# Patient Record
Sex: MALE | Race: BLACK OR AFRICAN AMERICAN
[De-identification: names, ages, dates, MRNs, and addresses within clinical notes are randomized per-mention and may not be internally consistent; named-entity substitution may affect disease eponyms.]

---

## 2017-09-06 ENCOUNTER — HOSPITAL ENCOUNTER (OUTPATIENT)
Dept: HOSPITAL 35 - PAIN | Age: 57
End: 2017-09-06
Payer: COMMERCIAL

## 2017-09-06 VITALS — DIASTOLIC BLOOD PRESSURE: 85 MMHG | SYSTOLIC BLOOD PRESSURE: 154 MMHG

## 2017-09-06 DIAGNOSIS — I89.0: Primary | ICD-10-CM

## 2017-09-06 DIAGNOSIS — Z88.6: ICD-10-CM

## 2017-09-06 DIAGNOSIS — G20: ICD-10-CM

## 2017-09-19 ENCOUNTER — HOSPITAL ENCOUNTER (INPATIENT)
Dept: HOSPITAL 35 - ER | Age: 57
LOS: 4 days | Discharge: HOME HEALTH | DRG: 57 | End: 2017-09-23
Attending: INTERNAL MEDICINE | Admitting: INTERNAL MEDICINE
Payer: COMMERCIAL

## 2017-09-19 VITALS — SYSTOLIC BLOOD PRESSURE: 152 MMHG | DIASTOLIC BLOOD PRESSURE: 86 MMHG

## 2017-09-19 VITALS — DIASTOLIC BLOOD PRESSURE: 96 MMHG | SYSTOLIC BLOOD PRESSURE: 145 MMHG

## 2017-09-19 VITALS
BODY MASS INDEX: 35.07 KG/M2 | HEIGHT: 70 IN | SYSTOLIC BLOOD PRESSURE: 137 MMHG | WEIGHT: 245 LBS | DIASTOLIC BLOOD PRESSURE: 81 MMHG

## 2017-09-19 VITALS — SYSTOLIC BLOOD PRESSURE: 132 MMHG | DIASTOLIC BLOOD PRESSURE: 86 MMHG

## 2017-09-19 DIAGNOSIS — N39.0: ICD-10-CM

## 2017-09-19 DIAGNOSIS — Z79.899: ICD-10-CM

## 2017-09-19 DIAGNOSIS — M54.5: ICD-10-CM

## 2017-09-19 DIAGNOSIS — I73.9: ICD-10-CM

## 2017-09-19 DIAGNOSIS — Z82.49: ICD-10-CM

## 2017-09-19 DIAGNOSIS — G89.29: ICD-10-CM

## 2017-09-19 DIAGNOSIS — Z23: ICD-10-CM

## 2017-09-19 DIAGNOSIS — G20: Primary | ICD-10-CM
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
## 2017-09-19 DIAGNOSIS — Z87.19: ICD-10-CM

## 2017-09-19 DIAGNOSIS — E44.0: ICD-10-CM
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
## 2017-09-19 DIAGNOSIS — Z88.5: ICD-10-CM

## 2017-09-19 DIAGNOSIS — I87.8: ICD-10-CM
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
## 2017-09-19 LAB
ALBUMIN SERPL-MCNC: 2.9 G/DL (ref 3.4–5)
ALP SERPL-CCNC: 72 U/L (ref 46–116)
ALT SERPL-CCNC: 16 U/L (ref 30–65)
ANION GAP SERPL CALC-SCNC: 8 MMOL/L (ref 7–16)
AST SERPL-CCNC: 20 U/L (ref 15–37)
BILIRUB SERPL-MCNC: 0.4 MG/DL
BILIRUB UR-MCNC: NEGATIVE MG/DL
BUN SERPL-MCNC: 9 MG/DL (ref 7–18)
CALCIUM SERPL-MCNC: 8.5 MG/DL (ref 8.5–10.1)
CHLORIDE SERPL-SCNC: 106 MMOL/L (ref 98–107)
CO2 SERPL-SCNC: 23 MMOL/L (ref 21–32)
COLOR UR: YELLOW
CREAT SERPL-MCNC: 0.8 MG/DL (ref 0.7–1.3)
ERYTHROCYTE [DISTWIDTH] IN BLOOD BY AUTOMATED COUNT: 14.4 % (ref 10.5–14.5)
GLUCOSE SERPL-MCNC: 87 MG/DL (ref 74–106)
HCT VFR BLD CALC: 37.6 % (ref 42–52)
HGB BLD-MCNC: 12.1 GM/DL (ref 14–18)
KETONES UR STRIP-MCNC: NEGATIVE MG/DL
MCH RBC QN AUTO: 28.5 PG (ref 26–34)
MCHC RBC AUTO-ENTMCNC: 32.3 G/DL (ref 28–37)
MCV RBC: 88.4 FL (ref 80–100)
PLATELET # BLD: 192 THOU/UL (ref 150–400)
POTASSIUM SERPL-SCNC: 4.9 MMOL/L (ref 3.5–5.1)
PROT SERPL-MCNC: 7.2 G/DL (ref 6.4–8.2)
RBC # BLD AUTO: 4.25 MIL/UL (ref 4.5–6)
RBC # UR STRIP: (no result) /UL
SODIUM SERPL-SCNC: 137 MMOL/L (ref 136–145)
SP GR UR STRIP: 1.02 (ref 1–1.03)
TROPONIN I SERPL-MCNC: < 0.04 NG/ML
URINE GLUCOSE-RANDOM*: NEGATIVE
URINE LEUKOCYTES-REFLEX: (no result)
URINE PROTEIN (DIPSTICK): (no result)
URINE WBC-REFLEX: (no result) /HPF (ref 0–5)
UROBILINOGEN UR STRIP-ACNC: 0.2 E.U./DL (ref 0.2–1)
WBC # BLD AUTO: 4.2 THOU/UL (ref 4–11)

## 2017-09-19 PROCEDURE — 10183: CPT

## 2017-09-20 VITALS — DIASTOLIC BLOOD PRESSURE: 99 MMHG | SYSTOLIC BLOOD PRESSURE: 149 MMHG

## 2017-09-20 VITALS — DIASTOLIC BLOOD PRESSURE: 93 MMHG | SYSTOLIC BLOOD PRESSURE: 135 MMHG

## 2017-09-20 VITALS — DIASTOLIC BLOOD PRESSURE: 92 MMHG | SYSTOLIC BLOOD PRESSURE: 142 MMHG

## 2017-09-20 LAB
ANION GAP SERPL CALC-SCNC: 8 MMOL/L (ref 7–16)
BASOPHILS NFR BLD AUTO: 0.6 % (ref 0–2)
BUN SERPL-MCNC: 11 MG/DL (ref 7–18)
CALCIUM SERPL-MCNC: 8.4 MG/DL (ref 8.5–10.1)
CHLORIDE SERPL-SCNC: 107 MMOL/L (ref 98–107)
CO2 SERPL-SCNC: 28 MMOL/L (ref 21–32)
CREAT SERPL-MCNC: 1 MG/DL (ref 0.7–1.3)
EOSINOPHIL NFR BLD: 2.9 % (ref 0–3)
ERYTHROCYTE [DISTWIDTH] IN BLOOD BY AUTOMATED COUNT: 14.2 % (ref 10.5–14.5)
GLUCOSE SERPL-MCNC: 92 MG/DL (ref 74–106)
GRANULOCYTES NFR BLD MANUAL: 44.9 % (ref 36–66)
HCT VFR BLD CALC: 34.6 % (ref 42–52)
HGB BLD-MCNC: 11.5 GM/DL (ref 14–18)
LYMPHOCYTES NFR BLD AUTO: 40.1 % (ref 24–44)
MAGNESIUM SERPL-MCNC: 1.9 MG/DL (ref 1.8–2.4)
MANUAL DIFFERENTIAL PERFORMED BLD QL: NO
MCH RBC QN AUTO: 28.9 PG (ref 26–34)
MCHC RBC AUTO-ENTMCNC: 33.2 G/DL (ref 28–37)
MCV RBC: 87.3 FL (ref 80–100)
MONOCYTES NFR BLD: 11.5 % (ref 1–8)
NEUTROPHILS # BLD: 2 THOU/UL (ref 1.4–8.2)
PLATELET # BLD: 195 THOU/UL (ref 150–400)
POTASSIUM SERPL-SCNC: 3.4 MMOL/L (ref 3.5–5.1)
RBC # BLD AUTO: 3.96 MIL/UL (ref 4.5–6)
SODIUM SERPL-SCNC: 143 MMOL/L (ref 136–145)
WBC # BLD AUTO: 4.5 THOU/UL (ref 4–11)

## 2017-09-21 VITALS — SYSTOLIC BLOOD PRESSURE: 131 MMHG | DIASTOLIC BLOOD PRESSURE: 76 MMHG

## 2017-09-21 VITALS — DIASTOLIC BLOOD PRESSURE: 90 MMHG | SYSTOLIC BLOOD PRESSURE: 130 MMHG

## 2017-09-21 VITALS — SYSTOLIC BLOOD PRESSURE: 152 MMHG | DIASTOLIC BLOOD PRESSURE: 102 MMHG

## 2017-09-21 VITALS — DIASTOLIC BLOOD PRESSURE: 64 MMHG | SYSTOLIC BLOOD PRESSURE: 107 MMHG

## 2017-09-21 VITALS — SYSTOLIC BLOOD PRESSURE: 130 MMHG | DIASTOLIC BLOOD PRESSURE: 84 MMHG

## 2017-09-22 VITALS — SYSTOLIC BLOOD PRESSURE: 117 MMHG | DIASTOLIC BLOOD PRESSURE: 75 MMHG

## 2017-09-22 VITALS — SYSTOLIC BLOOD PRESSURE: 128 MMHG | DIASTOLIC BLOOD PRESSURE: 87 MMHG

## 2017-09-22 VITALS — DIASTOLIC BLOOD PRESSURE: 87 MMHG | SYSTOLIC BLOOD PRESSURE: 128 MMHG

## 2017-09-22 VITALS — DIASTOLIC BLOOD PRESSURE: 84 MMHG | SYSTOLIC BLOOD PRESSURE: 130 MMHG

## 2017-09-22 VITALS — DIASTOLIC BLOOD PRESSURE: 94 MMHG | SYSTOLIC BLOOD PRESSURE: 141 MMHG

## 2017-09-22 VITALS — SYSTOLIC BLOOD PRESSURE: 106 MMHG | DIASTOLIC BLOOD PRESSURE: 69 MMHG

## 2017-09-22 LAB
ANION GAP SERPL CALC-SCNC: 5 MMOL/L (ref 7–16)
BASOPHILS NFR BLD AUTO: 0 % (ref 0–2)
BUN SERPL-MCNC: 12 MG/DL (ref 7–18)
CALCIUM SERPL-MCNC: 8.4 MG/DL (ref 8.5–10.1)
CHLORIDE SERPL-SCNC: 105 MMOL/L (ref 98–107)
CO2 SERPL-SCNC: 28 MMOL/L (ref 21–32)
CREAT SERPL-MCNC: 0.9 MG/DL (ref 0.7–1.3)
EOSINOPHIL NFR BLD: 8 % (ref 0–3)
ERYTHROCYTE [DISTWIDTH] IN BLOOD BY AUTOMATED COUNT: 14.3 % (ref 10.5–14.5)
GLUCOSE SERPL-MCNC: 85 MG/DL (ref 74–106)
GRANULOCYTES NFR BLD MANUAL: 36 % (ref 36–66)
HCT VFR BLD CALC: 37 % (ref 42–52)
HGB BLD-MCNC: 12.2 GM/DL (ref 14–18)
LYMPHOCYTES NFR BLD AUTO: 44 % (ref 24–44)
MANUAL DIFFERENTIAL PERFORMED BLD QL: YES
MCH RBC QN AUTO: 28.9 PG (ref 26–34)
MCHC RBC AUTO-ENTMCNC: 32.9 G/DL (ref 28–37)
MCV RBC: 87.8 FL (ref 80–100)
MONOCYTES NFR BLD: 12 % (ref 1–8)
NEUTROPHILS # BLD: 1.5 THOU/UL (ref 1.4–8.2)
PLATELET # BLD EST: NORMAL 10*3/UL
PLATELET # BLD: 206 THOU/UL (ref 150–400)
POTASSIUM SERPL-SCNC: 4.1 MMOL/L (ref 3.5–5.1)
RBC # BLD AUTO: 4.21 MIL/UL (ref 4.5–6)
RBC MORPH BLD: NORMAL
SODIUM SERPL-SCNC: 138 MMOL/L (ref 136–145)
TOTAL CELL COUNT: 100
WBC # BLD AUTO: 4.1 THOU/UL (ref 4–11)

## 2017-09-23 VITALS — SYSTOLIC BLOOD PRESSURE: 118 MMHG | DIASTOLIC BLOOD PRESSURE: 72 MMHG

## 2017-09-23 VITALS — DIASTOLIC BLOOD PRESSURE: 82 MMHG | SYSTOLIC BLOOD PRESSURE: 121 MMHG

## 2017-10-27 ENCOUNTER — HOSPITAL ENCOUNTER (EMERGENCY)
Dept: HOSPITAL 35 - ER | Age: 57
Discharge: HOME | End: 2017-10-27
Payer: COMMERCIAL

## 2017-10-27 VITALS — WEIGHT: 245 LBS | BODY MASS INDEX: 35.07 KG/M2 | HEIGHT: 70 IN
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
## 2017-10-27 DIAGNOSIS — G89.29: ICD-10-CM

## 2017-10-27 DIAGNOSIS — M54.5: Primary | ICD-10-CM

## 2017-10-27 DIAGNOSIS — G20: ICD-10-CM
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
## 2017-10-27 DIAGNOSIS — Z88.5: ICD-10-CM
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
## 2017-10-27 LAB
ANION GAP SERPL CALC-SCNC: 7 MMOL/L (ref 7–16)
BUN SERPL-MCNC: 7 MG/DL (ref 7–18)
CALCIUM SERPL-MCNC: 8.7 MG/DL (ref 8.5–10.1)
CHLORIDE SERPL-SCNC: 107 MMOL/L (ref 98–107)
CO2 SERPL-SCNC: 27 MMOL/L (ref 21–32)
CREAT SERPL-MCNC: 0.8 MG/DL (ref 0.7–1.3)
ERYTHROCYTE [DISTWIDTH] IN BLOOD BY AUTOMATED COUNT: 14.3 % (ref 10.5–14.5)
GLUCOSE SERPL-MCNC: 94 MG/DL (ref 74–106)
HCT VFR BLD CALC: 36.9 % (ref 42–52)
HGB BLD-MCNC: 12 GM/DL (ref 14–18)
INR PPP: 1
MCH RBC QN AUTO: 28.5 PG (ref 26–34)
MCHC RBC AUTO-ENTMCNC: 32.6 G/DL (ref 28–37)
MCV RBC: 87.2 FL (ref 80–100)
PLATELET # BLD: 222 THOU/UL (ref 150–400)
POTASSIUM SERPL-SCNC: 3.8 MMOL/L (ref 3.5–5.1)
PROTHROMBIN TIME: 10.7 SECONDS (ref 9.3–11.4)
RBC # BLD AUTO: 4.23 MIL/UL (ref 4.5–6)
SODIUM SERPL-SCNC: 141 MMOL/L (ref 136–145)
WBC # BLD AUTO: 4.3 THOU/UL (ref 4–11)

## 2017-10-30 ENCOUNTER — HOSPITAL ENCOUNTER (EMERGENCY)
Dept: HOSPITAL 35 - ER | Age: 57
Discharge: HOME | End: 2017-10-30
Payer: COMMERCIAL

## 2017-10-30 VITALS — HEIGHT: 70 IN | BODY MASS INDEX: 35.79 KG/M2 | WEIGHT: 250 LBS

## 2017-10-30 DIAGNOSIS — R51: ICD-10-CM

## 2017-10-30 DIAGNOSIS — Z88.5: ICD-10-CM

## 2017-10-30 DIAGNOSIS — G20: ICD-10-CM

## 2017-10-30 DIAGNOSIS — M25.511: Primary | ICD-10-CM

## 2017-10-30 DIAGNOSIS — G89.29: ICD-10-CM

## 2017-10-30 DIAGNOSIS — I87.8: ICD-10-CM

## 2017-10-30 LAB
ALBUMIN SERPL-MCNC: 3.4 G/DL (ref 3.4–5)
ALP SERPL-CCNC: 88 U/L (ref 46–116)
ALT SERPL-CCNC: 16 U/L (ref 30–65)
ANION GAP SERPL CALC-SCNC: 6 MMOL/L (ref 7–16)
AST SERPL-CCNC: 14 U/L (ref 15–37)
BILIRUB SERPL-MCNC: 0.4 MG/DL
BUN SERPL-MCNC: 8 MG/DL (ref 7–18)
CALCIUM SERPL-MCNC: 9.2 MG/DL (ref 8.5–10.1)
CHLORIDE SERPL-SCNC: 105 MMOL/L (ref 98–107)
CO2 SERPL-SCNC: 30 MMOL/L (ref 21–32)
CREAT SERPL-MCNC: 0.9 MG/DL (ref 0.7–1.3)
ERYTHROCYTE [DISTWIDTH] IN BLOOD BY AUTOMATED COUNT: 14.6 % (ref 10.5–14.5)
GLUCOSE SERPL-MCNC: 90 MG/DL (ref 74–106)
HCT VFR BLD CALC: 37.7 % (ref 42–52)
HGB BLD-MCNC: 12.2 GM/DL (ref 14–18)
MCH RBC QN AUTO: 28.4 PG (ref 26–34)
MCHC RBC AUTO-ENTMCNC: 32.3 G/DL (ref 28–37)
MCV RBC: 87.9 FL (ref 80–100)
PLATELET # BLD: 224 THOU/UL (ref 150–400)
POTASSIUM SERPL-SCNC: 4.2 MMOL/L (ref 3.5–5.1)
PROT SERPL-MCNC: 7.3 G/DL (ref 6.4–8.2)
RBC # BLD AUTO: 4.29 MIL/UL (ref 4.5–6)
SODIUM SERPL-SCNC: 141 MMOL/L (ref 136–145)
TROPONIN I SERPL-MCNC: < 0.04 NG/ML (ref ?–0.06)
WBC # BLD AUTO: 4.3 THOU/UL (ref 4–11)

## 2017-11-30 ENCOUNTER — HOSPITAL ENCOUNTER (INPATIENT)
Dept: HOSPITAL 35 - ER | Age: 57
LOS: 6 days | Discharge: HOME HEALTH | DRG: 556 | End: 2017-12-06
Attending: HOSPITALIST | Admitting: HOSPITALIST
Payer: COMMERCIAL

## 2017-11-30 VITALS — DIASTOLIC BLOOD PRESSURE: 94 MMHG | SYSTOLIC BLOOD PRESSURE: 162 MMHG

## 2017-11-30 VITALS — DIASTOLIC BLOOD PRESSURE: 72 MMHG | SYSTOLIC BLOOD PRESSURE: 149 MMHG

## 2017-11-30 VITALS — HEIGHT: 70 IN | WEIGHT: 225 LBS | BODY MASS INDEX: 32.21 KG/M2

## 2017-11-30 VITALS — DIASTOLIC BLOOD PRESSURE: 74 MMHG | SYSTOLIC BLOOD PRESSURE: 118 MMHG

## 2017-11-30 VITALS — SYSTOLIC BLOOD PRESSURE: 159 MMHG | DIASTOLIC BLOOD PRESSURE: 94 MMHG

## 2017-11-30 DIAGNOSIS — Z98.62: ICD-10-CM

## 2017-11-30 DIAGNOSIS — Z28.21: ICD-10-CM

## 2017-11-30 DIAGNOSIS — R60.0: ICD-10-CM

## 2017-11-30 DIAGNOSIS — I89.0: ICD-10-CM

## 2017-11-30 DIAGNOSIS — Z79.899: ICD-10-CM

## 2017-11-30 DIAGNOSIS — M25.552: Primary | ICD-10-CM

## 2017-11-30 DIAGNOSIS — G89.29: ICD-10-CM

## 2017-11-30 DIAGNOSIS — I87.8: ICD-10-CM

## 2017-11-30 DIAGNOSIS — Z96.642: ICD-10-CM

## 2017-11-30 DIAGNOSIS — R53.81: ICD-10-CM

## 2017-11-30 DIAGNOSIS — G20: ICD-10-CM

## 2017-11-30 DIAGNOSIS — Z88.8: ICD-10-CM

## 2017-11-30 DIAGNOSIS — K59.00: ICD-10-CM

## 2017-11-30 DIAGNOSIS — R26.9: ICD-10-CM

## 2017-11-30 LAB
ALBUMIN SERPL-MCNC: 3.2 G/DL (ref 3.4–5)
ALP SERPL-CCNC: 81 U/L (ref 46–116)
ALT SERPL-CCNC: 14 U/L (ref 30–65)
ANION GAP SERPL CALC-SCNC: 7 MMOL/L (ref 7–16)
AST SERPL-CCNC: 15 U/L (ref 15–37)
BILIRUB DIRECT SERPL-MCNC: 0.1 MG/DL
BILIRUB SERPL-MCNC: 0.4 MG/DL
BUN SERPL-MCNC: 7 MG/DL (ref 7–18)
CALCIUM SERPL-MCNC: 9.5 MG/DL (ref 8.5–10.1)
CHLORIDE SERPL-SCNC: 106 MMOL/L (ref 98–107)
CO2 SERPL-SCNC: 29 MMOL/L (ref 21–32)
CREAT SERPL-MCNC: 0.9 MG/DL (ref 0.7–1.3)
ERYTHROCYTE [DISTWIDTH] IN BLOOD BY AUTOMATED COUNT: 14.3 % (ref 10.5–14.5)
GLUCOSE SERPL-MCNC: 88 MG/DL (ref 74–106)
HCT VFR BLD CALC: 38.9 % (ref 42–52)
HGB BLD-MCNC: 12.4 GM/DL (ref 14–18)
MAGNESIUM SERPL-MCNC: 2 MG/DL (ref 1.8–2.4)
MCH RBC QN AUTO: 28.1 PG (ref 26–34)
MCHC RBC AUTO-ENTMCNC: 31.9 G/DL (ref 28–37)
MCV RBC: 88 FL (ref 80–100)
PLATELET # BLD: 252 THOU/UL (ref 150–400)
POTASSIUM SERPL-SCNC: 4.2 MMOL/L (ref 3.5–5.1)
PROT SERPL-MCNC: 7.9 G/DL (ref 6.4–8.2)
RBC # BLD AUTO: 4.42 MIL/UL (ref 4.5–6)
SODIUM SERPL-SCNC: 142 MMOL/L (ref 136–145)
WBC # BLD AUTO: 4.5 THOU/UL (ref 4–11)

## 2017-11-30 PROCEDURE — 10790: CPT

## 2017-12-01 VITALS — SYSTOLIC BLOOD PRESSURE: 121 MMHG | DIASTOLIC BLOOD PRESSURE: 74 MMHG

## 2017-12-01 VITALS — DIASTOLIC BLOOD PRESSURE: 93 MMHG | SYSTOLIC BLOOD PRESSURE: 140 MMHG

## 2017-12-01 VITALS — DIASTOLIC BLOOD PRESSURE: 87 MMHG | SYSTOLIC BLOOD PRESSURE: 142 MMHG

## 2017-12-01 VITALS — SYSTOLIC BLOOD PRESSURE: 145 MMHG | DIASTOLIC BLOOD PRESSURE: 91 MMHG

## 2017-12-02 VITALS — SYSTOLIC BLOOD PRESSURE: 143 MMHG | DIASTOLIC BLOOD PRESSURE: 93 MMHG

## 2017-12-02 VITALS — SYSTOLIC BLOOD PRESSURE: 131 MMHG | DIASTOLIC BLOOD PRESSURE: 90 MMHG

## 2017-12-02 VITALS — SYSTOLIC BLOOD PRESSURE: 125 MMHG | DIASTOLIC BLOOD PRESSURE: 84 MMHG

## 2017-12-02 VITALS — DIASTOLIC BLOOD PRESSURE: 71 MMHG | SYSTOLIC BLOOD PRESSURE: 115 MMHG

## 2017-12-03 VITALS — DIASTOLIC BLOOD PRESSURE: 88 MMHG | SYSTOLIC BLOOD PRESSURE: 159 MMHG

## 2017-12-03 VITALS — DIASTOLIC BLOOD PRESSURE: 94 MMHG | SYSTOLIC BLOOD PRESSURE: 131 MMHG

## 2017-12-03 VITALS — DIASTOLIC BLOOD PRESSURE: 96 MMHG | SYSTOLIC BLOOD PRESSURE: 140 MMHG

## 2017-12-03 LAB
ANION GAP SERPL CALC-SCNC: 8 MMOL/L (ref 7–16)
CHLORIDE SERPL-SCNC: 106 MMOL/L (ref 98–107)
CO2 SERPL-SCNC: 27 MMOL/L (ref 21–32)
POTASSIUM SERPL-SCNC: 3.9 MMOL/L (ref 3.5–5.1)
SODIUM SERPL-SCNC: 141 MMOL/L (ref 136–145)

## 2017-12-04 VITALS — DIASTOLIC BLOOD PRESSURE: 88 MMHG | SYSTOLIC BLOOD PRESSURE: 131 MMHG

## 2017-12-04 VITALS — SYSTOLIC BLOOD PRESSURE: 114 MMHG | DIASTOLIC BLOOD PRESSURE: 77 MMHG

## 2017-12-04 VITALS — DIASTOLIC BLOOD PRESSURE: 73 MMHG | SYSTOLIC BLOOD PRESSURE: 114 MMHG

## 2017-12-04 VITALS — DIASTOLIC BLOOD PRESSURE: 75 MMHG | SYSTOLIC BLOOD PRESSURE: 117 MMHG

## 2017-12-05 VITALS — DIASTOLIC BLOOD PRESSURE: 71 MMHG | SYSTOLIC BLOOD PRESSURE: 112 MMHG

## 2017-12-05 VITALS — DIASTOLIC BLOOD PRESSURE: 72 MMHG | SYSTOLIC BLOOD PRESSURE: 108 MMHG

## 2017-12-05 VITALS — SYSTOLIC BLOOD PRESSURE: 142 MMHG | DIASTOLIC BLOOD PRESSURE: 89 MMHG

## 2017-12-05 LAB
ANION GAP SERPL CALC-SCNC: 6 MMOL/L (ref 7–16)
ANISOCYTOSIS BLD QL SMEAR: SLIGHT
BASOPHILS NFR BLD AUTO: 0 % (ref 0–2)
BF MACROPHAGE: 3
BF NEUTROPHILS: 93
BF NUCLEATED CELLS: 1354
BF RBC: (no result)
BUN SERPL-MCNC: 10 MG/DL (ref 7–18)
CALCIUM SERPL-MCNC: 8.7 MG/DL (ref 8.5–10.1)
CHLORIDE SERPL-SCNC: 104 MMOL/L (ref 98–107)
CLARITY UR: (no result)
CO2 SERPL-SCNC: 29 MMOL/L (ref 21–32)
COLOR UR: (no result)
CREAT SERPL-MCNC: 0.9 MG/DL (ref 0.7–1.3)
EOSINOPHIL NFR BLD: 2 % (ref 0–3)
ERYTHROCYTE [DISTWIDTH] IN BLOOD BY AUTOMATED COUNT: 13.9 % (ref 10.5–14.5)
GLUCOSE SERPL-MCNC: 91 MG/DL (ref 74–106)
GRANULOCYTES NFR BLD MANUAL: 43 % (ref 36–66)
HCT VFR BLD CALC: 36.5 % (ref 42–52)
HGB BLD-MCNC: 11.7 GM/DL (ref 14–18)
LYMPHOCYTES NFR BLD AUTO: 46 % (ref 24–44)
MANUAL DIFFERENTIAL PERFORMED BLD QL: YES
MANUAL DIFFERENTIAL PERFORMED BLD QL: YES
MCH RBC QN AUTO: 28.3 PG (ref 26–34)
MCHC RBC AUTO-ENTMCNC: 32.2 G/DL (ref 28–37)
MCV RBC: 87.8 FL (ref 80–100)
MONOCYTES NFR BLD: 9 % (ref 1–8)
NEUTROPHILS # BLD: 1.9 THOU/UL (ref 1.4–8.2)
NEUTS BAND NFR BLD: 0 % (ref 0–8)
PLATELET # BLD: 252 THOU/UL (ref 150–400)
POTASSIUM SERPL-SCNC: 4.5 MMOL/L (ref 3.5–5.1)
RBC # BLD AUTO: 4.16 MIL/UL (ref 4.5–6)
SODIUM SERPL-SCNC: 139 MMOL/L (ref 136–145)
SPECIMEN VOL 24H UR: 3 ML
TOTAL CELL COUNT: 100
WBC # BLD AUTO: 4.5 THOU/UL (ref 4–11)

## 2017-12-05 PROCEDURE — 0S9B3ZX DRAINAGE OF LEFT HIP JOINT, PERCUTANEOUS APPROACH, DIAGNOSTIC: ICD-10-PCS

## 2017-12-06 VITALS — SYSTOLIC BLOOD PRESSURE: 110 MMHG | DIASTOLIC BLOOD PRESSURE: 73 MMHG

## 2017-12-06 VITALS — DIASTOLIC BLOOD PRESSURE: 73 MMHG | SYSTOLIC BLOOD PRESSURE: 110 MMHG

## 2017-12-06 VITALS — DIASTOLIC BLOOD PRESSURE: 57 MMHG | SYSTOLIC BLOOD PRESSURE: 104 MMHG

## 2017-12-06 VITALS — SYSTOLIC BLOOD PRESSURE: 122 MMHG | DIASTOLIC BLOOD PRESSURE: 74 MMHG

## 2017-12-08 LAB
ALBUMIN FLD-MCNC: < 0.2 G/DL
AMYLASE FLD-CCNC: < 3 U/L
BODY FLUID LDH: 123 IU/L
GLUCOSE FLD-MCNC: 4 MG/DL
PROT FLD-MCNC: 0.5 G/DL

## 2017-12-29 ENCOUNTER — HOSPITAL ENCOUNTER (OUTPATIENT)
Dept: HOSPITAL 35 - PAIN | Age: 57
End: 2017-12-29
Payer: COMMERCIAL

## 2017-12-29 VITALS — SYSTOLIC BLOOD PRESSURE: 148 MMHG | DIASTOLIC BLOOD PRESSURE: 94 MMHG

## 2017-12-29 DIAGNOSIS — R53.81: ICD-10-CM

## 2017-12-29 DIAGNOSIS — R26.89: ICD-10-CM

## 2017-12-29 DIAGNOSIS — I89.0: ICD-10-CM

## 2017-12-29 DIAGNOSIS — R60.0: ICD-10-CM

## 2017-12-29 DIAGNOSIS — Z91.81: ICD-10-CM

## 2017-12-29 DIAGNOSIS — G25.89: Primary | ICD-10-CM

## 2017-12-29 DIAGNOSIS — G20: ICD-10-CM

## 2018-02-21 ENCOUNTER — HOSPITAL ENCOUNTER (OUTPATIENT)
Dept: HOSPITAL 35 - PAIN | Age: 58
End: 2018-02-21
Payer: COMMERCIAL

## 2018-02-21 VITALS — SYSTOLIC BLOOD PRESSURE: 143 MMHG | DIASTOLIC BLOOD PRESSURE: 83 MMHG

## 2018-02-21 DIAGNOSIS — G20: ICD-10-CM

## 2018-02-21 DIAGNOSIS — I89.0: Primary | ICD-10-CM

## 2018-02-21 DIAGNOSIS — M25.552: ICD-10-CM

## 2018-02-21 DIAGNOSIS — I87.2: ICD-10-CM

## 2018-02-21 DIAGNOSIS — Z96.642: ICD-10-CM

## 2018-03-28 ENCOUNTER — HOSPITAL ENCOUNTER (INPATIENT)
Dept: HOSPITAL 35 - ER | Age: 58
LOS: 2 days | Discharge: ANOTHER HOSPITAL | DRG: 203 | End: 2018-03-30
Attending: HOSPITALIST | Admitting: HOSPITALIST
Payer: COMMERCIAL

## 2018-03-28 VITALS — DIASTOLIC BLOOD PRESSURE: 77 MMHG | SYSTOLIC BLOOD PRESSURE: 160 MMHG

## 2018-03-28 VITALS — DIASTOLIC BLOOD PRESSURE: 71 MMHG | SYSTOLIC BLOOD PRESSURE: 126 MMHG

## 2018-03-28 VITALS — SYSTOLIC BLOOD PRESSURE: 126 MMHG | DIASTOLIC BLOOD PRESSURE: 71 MMHG

## 2018-03-28 VITALS — DIASTOLIC BLOOD PRESSURE: 72 MMHG | SYSTOLIC BLOOD PRESSURE: 131 MMHG

## 2018-03-28 VITALS — BODY MASS INDEX: 28.63 KG/M2 | WEIGHT: 200 LBS | HEIGHT: 70 IN

## 2018-03-28 DIAGNOSIS — J40: Primary | ICD-10-CM

## 2018-03-28 DIAGNOSIS — I89.0: ICD-10-CM

## 2018-03-28 DIAGNOSIS — I87.8: ICD-10-CM

## 2018-03-28 DIAGNOSIS — Z88.5: ICD-10-CM

## 2018-03-28 DIAGNOSIS — G89.29: ICD-10-CM

## 2018-03-28 DIAGNOSIS — Z79.899: ICD-10-CM

## 2018-03-28 DIAGNOSIS — G20: ICD-10-CM

## 2018-03-28 DIAGNOSIS — Z28.21: ICD-10-CM

## 2018-03-28 DIAGNOSIS — Z96.642: ICD-10-CM

## 2018-03-28 DIAGNOSIS — I73.9: ICD-10-CM

## 2018-03-28 DIAGNOSIS — Z87.311: ICD-10-CM

## 2018-03-28 LAB
ANION GAP SERPL CALC-SCNC: 9 MMOL/L (ref 7–16)
BILIRUB UR-MCNC: (no result) MG/DL
BUN SERPL-MCNC: 13 MG/DL (ref 7–18)
CALCIUM SERPL-MCNC: 9.2 MG/DL (ref 8.5–10.1)
CHLORIDE SERPL-SCNC: 105 MMOL/L (ref 98–107)
CO2 SERPL-SCNC: 28 MMOL/L (ref 21–32)
COLOR UR: YELLOW
CREAT SERPL-MCNC: 0.9 MG/DL (ref 0.7–1.3)
EOSINOPHIL NFR BLD: 4 % (ref 0–3)
ERYTHROCYTE [DISTWIDTH] IN BLOOD BY AUTOMATED COUNT: 14.7 % (ref 10.5–14.5)
GLUCOSE SERPL-MCNC: 97 MG/DL (ref 74–106)
GRANULOCYTES NFR BLD MANUAL: 50 % (ref 36–66)
HCT VFR BLD CALC: 38.4 % (ref 42–52)
HGB BLD-MCNC: 12.5 GM/DL (ref 14–18)
KETONES UR STRIP-MCNC: NEGATIVE MG/DL
LYMPHOCYTES NFR BLD AUTO: 38 % (ref 24–44)
MAGNESIUM SERPL-MCNC: 2.2 MG/DL (ref 1.8–2.4)
MCH RBC QN AUTO: 28.7 PG (ref 26–34)
MCHC RBC AUTO-ENTMCNC: 32.6 G/DL (ref 28–37)
MCV RBC: 88 FL (ref 80–100)
MONOCYTES NFR BLD: 8 % (ref 1–8)
NEUTROPHILS # BLD: 2.2 THOU/UL (ref 1.4–8.2)
PLATELET # BLD: 197 THOU/UL (ref 150–400)
POTASSIUM SERPL-SCNC: 4.6 MMOL/L (ref 3.5–5.1)
RBC # BLD AUTO: 4.36 MIL/UL (ref 4.5–6)
RBC # UR STRIP: NEGATIVE /UL
RBC MORPH BLD: NORMAL
SODIUM SERPL-SCNC: 142 MMOL/L (ref 136–145)
SP GR UR STRIP: 1.02 (ref 1–1.03)
URINE CLARITY: CLEAR
URINE GLUCOSE-RANDOM*: NEGATIVE
URINE LEUKOCYTES-REFLEX: NEGATIVE
URINE NITRITE-REFLEX: NEGATIVE
URINE PROTEIN (DIPSTICK): NEGATIVE
UROBILINOGEN UR STRIP-ACNC: 1 E.U./DL (ref 0.2–1)
WBC # BLD AUTO: 4.3 THOU/UL (ref 4–11)

## 2018-03-28 PROCEDURE — 10040 EXTRACTION: CPT

## 2018-03-29 VITALS — DIASTOLIC BLOOD PRESSURE: 75 MMHG | SYSTOLIC BLOOD PRESSURE: 122 MMHG

## 2018-03-29 VITALS — SYSTOLIC BLOOD PRESSURE: 128 MMHG | DIASTOLIC BLOOD PRESSURE: 76 MMHG

## 2018-03-29 VITALS — DIASTOLIC BLOOD PRESSURE: 95 MMHG | SYSTOLIC BLOOD PRESSURE: 153 MMHG

## 2018-03-29 VITALS — DIASTOLIC BLOOD PRESSURE: 90 MMHG | SYSTOLIC BLOOD PRESSURE: 142 MMHG

## 2018-03-29 VITALS — DIASTOLIC BLOOD PRESSURE: 72 MMHG | SYSTOLIC BLOOD PRESSURE: 108 MMHG

## 2018-03-30 ENCOUNTER — HOSPITAL ENCOUNTER (INPATIENT)
Dept: HOSPITAL 35 - REHABU | Age: 58
LOS: 11 days | Discharge: HOME HEALTH | DRG: 57 | End: 2018-04-10
Attending: PHYSICAL MEDICINE & REHABILITATION | Admitting: PHYSICAL MEDICINE & REHABILITATION
Payer: COMMERCIAL

## 2018-03-30 VITALS — SYSTOLIC BLOOD PRESSURE: 109 MMHG | DIASTOLIC BLOOD PRESSURE: 65 MMHG

## 2018-03-30 VITALS — SYSTOLIC BLOOD PRESSURE: 121 MMHG | DIASTOLIC BLOOD PRESSURE: 79 MMHG

## 2018-03-30 VITALS — WEIGHT: 213.8 LBS | HEIGHT: 70 IN | BODY MASS INDEX: 30.61 KG/M2

## 2018-03-30 VITALS — DIASTOLIC BLOOD PRESSURE: 76 MMHG | SYSTOLIC BLOOD PRESSURE: 115 MMHG

## 2018-03-30 VITALS — DIASTOLIC BLOOD PRESSURE: 82 MMHG | SYSTOLIC BLOOD PRESSURE: 133 MMHG

## 2018-03-30 DIAGNOSIS — I73.9: ICD-10-CM

## 2018-03-30 DIAGNOSIS — F01.50: ICD-10-CM

## 2018-03-30 DIAGNOSIS — G20: Primary | ICD-10-CM

## 2018-03-30 DIAGNOSIS — Z96.642: ICD-10-CM

## 2018-03-30 DIAGNOSIS — Z95.820: ICD-10-CM

## 2018-03-30 DIAGNOSIS — J40: ICD-10-CM

## 2018-03-30 DIAGNOSIS — E55.9: ICD-10-CM

## 2018-03-30 DIAGNOSIS — G89.4: ICD-10-CM

## 2018-03-30 DIAGNOSIS — I89.0: ICD-10-CM

## 2018-03-30 DIAGNOSIS — R53.81: ICD-10-CM

## 2018-03-30 DIAGNOSIS — E53.8: ICD-10-CM

## 2018-03-30 DIAGNOSIS — Z88.6: ICD-10-CM

## 2018-03-30 DIAGNOSIS — I87.8: ICD-10-CM

## 2018-03-30 DIAGNOSIS — F02.80: ICD-10-CM

## 2018-03-30 DIAGNOSIS — F32.9: ICD-10-CM

## 2018-03-30 DIAGNOSIS — Z87.81: ICD-10-CM

## 2018-03-30 LAB
ANION GAP SERPL CALC-SCNC: 8 MMOL/L (ref 7–16)
BUN SERPL-MCNC: 11 MG/DL (ref 7–18)
CALCIUM SERPL-MCNC: 8.7 MG/DL (ref 8.5–10.1)
CHLORIDE SERPL-SCNC: 101 MMOL/L (ref 98–107)
CO2 SERPL-SCNC: 28 MMOL/L (ref 21–32)
CREAT SERPL-MCNC: 0.9 MG/DL (ref 0.7–1.3)
ERYTHROCYTE [DISTWIDTH] IN BLOOD BY AUTOMATED COUNT: 14.8 % (ref 10.5–14.5)
GLUCOSE SERPL-MCNC: 111 MG/DL (ref 74–106)
HCT VFR BLD CALC: 38.6 % (ref 42–52)
HGB BLD-MCNC: 12.6 GM/DL (ref 14–18)
MCH RBC QN AUTO: 28.3 PG (ref 26–34)
MCHC RBC AUTO-ENTMCNC: 32.6 G/DL (ref 28–37)
MCV RBC: 86.8 FL (ref 80–100)
PLATELET # BLD: 219 THOU/UL (ref 150–400)
POTASSIUM SERPL-SCNC: 4.4 MMOL/L (ref 3.5–5.1)
RBC # BLD AUTO: 4.45 MIL/UL (ref 4.5–6)
SODIUM SERPL-SCNC: 137 MMOL/L (ref 136–145)
WBC # BLD AUTO: 5 THOU/UL (ref 4–11)

## 2018-03-30 PROCEDURE — 10112: CPT

## 2018-03-31 VITALS — SYSTOLIC BLOOD PRESSURE: 126 MMHG | DIASTOLIC BLOOD PRESSURE: 65 MMHG

## 2018-03-31 VITALS — SYSTOLIC BLOOD PRESSURE: 106 MMHG | DIASTOLIC BLOOD PRESSURE: 62 MMHG

## 2018-03-31 LAB
ANION GAP SERPL CALC-SCNC: 7 MMOL/L (ref 7–16)
BUN SERPL-MCNC: 16 MG/DL (ref 7–18)
CALCIUM SERPL-MCNC: 9.2 MG/DL (ref 8.5–10.1)
CHLORIDE SERPL-SCNC: 98 MMOL/L (ref 98–107)
CO2 SERPL-SCNC: 28 MMOL/L (ref 21–32)
CREAT SERPL-MCNC: 1.1 MG/DL (ref 0.7–1.3)
ERYTHROCYTE [DISTWIDTH] IN BLOOD BY AUTOMATED COUNT: 14.7 % (ref 10.5–14.5)
GLUCOSE SERPL-MCNC: 105 MG/DL (ref 74–106)
HCT VFR BLD CALC: 40.3 % (ref 42–52)
HGB BLD-MCNC: 13.3 GM/DL (ref 14–18)
MCH RBC QN AUTO: 28.6 PG (ref 26–34)
MCHC RBC AUTO-ENTMCNC: 33.1 G/DL (ref 28–37)
MCV RBC: 86.4 FL (ref 80–100)
PLATELET # BLD: 248 THOU/UL (ref 150–400)
POTASSIUM SERPL-SCNC: 4.3 MMOL/L (ref 3.5–5.1)
RBC # BLD AUTO: 4.66 MIL/UL (ref 4.5–6)
SODIUM SERPL-SCNC: 133 MMOL/L (ref 136–145)
WBC # BLD AUTO: 7.3 THOU/UL (ref 4–11)

## 2018-04-01 VITALS — DIASTOLIC BLOOD PRESSURE: 74 MMHG | SYSTOLIC BLOOD PRESSURE: 119 MMHG

## 2018-04-01 VITALS — SYSTOLIC BLOOD PRESSURE: 100 MMHG | DIASTOLIC BLOOD PRESSURE: 69 MMHG

## 2018-04-02 VITALS — DIASTOLIC BLOOD PRESSURE: 69 MMHG | SYSTOLIC BLOOD PRESSURE: 103 MMHG

## 2018-04-02 VITALS — DIASTOLIC BLOOD PRESSURE: 75 MMHG | SYSTOLIC BLOOD PRESSURE: 121 MMHG

## 2018-04-02 LAB
ANION GAP SERPL CALC-SCNC: 6 MMOL/L (ref 7–16)
ANISOCYTOSIS BLD QL SMEAR: (no result)
BASOPHILS NFR BLD AUTO: 1 % (ref 0–2)
BUN SERPL-MCNC: 17 MG/DL (ref 7–18)
CALCIUM SERPL-MCNC: 9 MG/DL (ref 8.5–10.1)
CHLORIDE SERPL-SCNC: 101 MMOL/L (ref 98–107)
CO2 SERPL-SCNC: 30 MMOL/L (ref 21–32)
CREAT SERPL-MCNC: 0.9 MG/DL (ref 0.7–1.3)
EOSINOPHIL NFR BLD: 4 % (ref 0–3)
ERYTHROCYTE [DISTWIDTH] IN BLOOD BY AUTOMATED COUNT: 14.6 % (ref 10.5–14.5)
GLUCOSE SERPL-MCNC: 98 MG/DL (ref 74–106)
GRANULOCYTES NFR BLD MANUAL: 50 % (ref 36–66)
HCT VFR BLD CALC: 38.1 % (ref 42–52)
HGB BLD-MCNC: 12.6 GM/DL (ref 14–18)
LYMPHOCYTES NFR BLD AUTO: 33 % (ref 24–44)
MCH RBC QN AUTO: 28.5 PG (ref 26–34)
MCHC RBC AUTO-ENTMCNC: 33.1 G/DL (ref 28–37)
MCV RBC: 86 FL (ref 80–100)
MONOCYTES NFR BLD: 12 % (ref 1–8)
NEUTROPHILS # BLD: 3.1 THOU/UL (ref 1.4–8.2)
NEUTS BAND NFR BLD: 0 % (ref 0–8)
OVALOCYTES BLD QL SMEAR: (no result)
PLATELET # BLD: 283 THOU/UL (ref 150–400)
POLYCHROMASIA BLD QL SMEAR: (no result)
POTASSIUM SERPL-SCNC: 4.4 MMOL/L (ref 3.5–5.1)
RBC # BLD AUTO: 4.43 MIL/UL (ref 4.5–6)
SODIUM SERPL-SCNC: 137 MMOL/L (ref 136–145)
WBC # BLD AUTO: 6.1 THOU/UL (ref 4–11)

## 2018-04-03 VITALS — DIASTOLIC BLOOD PRESSURE: 74 MMHG | SYSTOLIC BLOOD PRESSURE: 113 MMHG

## 2018-04-03 VITALS — SYSTOLIC BLOOD PRESSURE: 134 MMHG | DIASTOLIC BLOOD PRESSURE: 88 MMHG

## 2018-04-04 VITALS — DIASTOLIC BLOOD PRESSURE: 77 MMHG | SYSTOLIC BLOOD PRESSURE: 114 MMHG

## 2018-04-04 VITALS — SYSTOLIC BLOOD PRESSURE: 121 MMHG | DIASTOLIC BLOOD PRESSURE: 75 MMHG

## 2018-04-05 VITALS — SYSTOLIC BLOOD PRESSURE: 125 MMHG | DIASTOLIC BLOOD PRESSURE: 72 MMHG

## 2018-04-05 VITALS — DIASTOLIC BLOOD PRESSURE: 68 MMHG | SYSTOLIC BLOOD PRESSURE: 139 MMHG

## 2018-04-06 VITALS — SYSTOLIC BLOOD PRESSURE: 114 MMHG | DIASTOLIC BLOOD PRESSURE: 75 MMHG

## 2018-04-06 VITALS — DIASTOLIC BLOOD PRESSURE: 73 MMHG | SYSTOLIC BLOOD PRESSURE: 134 MMHG

## 2018-04-07 VITALS — DIASTOLIC BLOOD PRESSURE: 79 MMHG | SYSTOLIC BLOOD PRESSURE: 116 MMHG

## 2018-04-07 VITALS — SYSTOLIC BLOOD PRESSURE: 115 MMHG | DIASTOLIC BLOOD PRESSURE: 63 MMHG

## 2018-04-08 VITALS — SYSTOLIC BLOOD PRESSURE: 120 MMHG | DIASTOLIC BLOOD PRESSURE: 71 MMHG

## 2018-04-08 VITALS — SYSTOLIC BLOOD PRESSURE: 119 MMHG | DIASTOLIC BLOOD PRESSURE: 77 MMHG

## 2018-04-09 VITALS — DIASTOLIC BLOOD PRESSURE: 68 MMHG | SYSTOLIC BLOOD PRESSURE: 113 MMHG

## 2018-04-09 VITALS — SYSTOLIC BLOOD PRESSURE: 115 MMHG | DIASTOLIC BLOOD PRESSURE: 72 MMHG

## 2018-04-09 LAB
ANION GAP SERPL CALC-SCNC: 5 MMOL/L (ref 7–16)
BASOPHILS NFR BLD AUTO: 0.6 % (ref 0–2)
BUN SERPL-MCNC: 10 MG/DL (ref 7–18)
CALCIUM SERPL-MCNC: 8.7 MG/DL (ref 8.5–10.1)
CHLORIDE SERPL-SCNC: 105 MMOL/L (ref 98–107)
CO2 SERPL-SCNC: 28 MMOL/L (ref 21–32)
CREAT SERPL-MCNC: 0.8 MG/DL (ref 0.7–1.3)
EOSINOPHIL NFR BLD: 2.2 % (ref 0–3)
ERYTHROCYTE [DISTWIDTH] IN BLOOD BY AUTOMATED COUNT: 14.9 % (ref 10.5–14.5)
GLUCOSE SERPL-MCNC: 90 MG/DL (ref 74–106)
GRANULOCYTES NFR BLD MANUAL: 47.3 % (ref 36–66)
HCT VFR BLD CALC: 35 % (ref 42–52)
HGB BLD-MCNC: 11.6 GM/DL (ref 14–18)
LYMPHOCYTES NFR BLD AUTO: 38.7 % (ref 24–44)
MAGNESIUM SERPL-MCNC: 1.8 MG/DL (ref 1.8–2.4)
MCH RBC QN AUTO: 28.8 PG (ref 26–34)
MCHC RBC AUTO-ENTMCNC: 33 G/DL (ref 28–37)
MCV RBC: 87.4 FL (ref 80–100)
MONOCYTES NFR BLD: 11.2 % (ref 1–8)
NEUTROPHILS # BLD: 2.4 THOU/UL (ref 1.4–8.2)
PLATELET # BLD: 397 THOU/UL (ref 150–400)
POTASSIUM SERPL-SCNC: 3.9 MMOL/L (ref 3.5–5.1)
RBC # BLD AUTO: 4.01 MIL/UL (ref 4.5–6)
SODIUM SERPL-SCNC: 138 MMOL/L (ref 136–145)
WBC # BLD AUTO: 5.1 THOU/UL (ref 4–11)

## 2018-04-10 VITALS — SYSTOLIC BLOOD PRESSURE: 125 MMHG | DIASTOLIC BLOOD PRESSURE: 79 MMHG

## 2018-04-10 VITALS — SYSTOLIC BLOOD PRESSURE: 113 MMHG | DIASTOLIC BLOOD PRESSURE: 68 MMHG

## 2018-04-18 ENCOUNTER — HOSPITAL ENCOUNTER (OUTPATIENT)
Dept: HOSPITAL 35 - PAIN | Age: 58
End: 2018-04-18
Payer: COMMERCIAL

## 2018-04-18 VITALS — DIASTOLIC BLOOD PRESSURE: 99 MMHG | SYSTOLIC BLOOD PRESSURE: 138 MMHG

## 2018-04-18 DIAGNOSIS — G20: Primary | ICD-10-CM

## 2018-04-18 DIAGNOSIS — M25.552: ICD-10-CM

## 2018-04-18 DIAGNOSIS — I87.2: ICD-10-CM

## 2018-04-18 DIAGNOSIS — R26.9: ICD-10-CM

## 2018-04-18 DIAGNOSIS — I10: ICD-10-CM

## 2018-04-18 DIAGNOSIS — I89.0: ICD-10-CM

## 2018-04-18 DIAGNOSIS — Z96.642: ICD-10-CM

## 2018-04-23 ENCOUNTER — HOSPITAL ENCOUNTER (INPATIENT)
Dept: HOSPITAL 35 - ER | Age: 58
LOS: 5 days | Discharge: NURSING, REHAB OR LONG-TERM CARE | DRG: 579 | End: 2018-04-28
Attending: HOSPITALIST | Admitting: HOSPITALIST
Payer: COMMERCIAL

## 2018-04-23 VITALS — WEIGHT: 210 LBS | BODY MASS INDEX: 30.06 KG/M2 | HEIGHT: 70 IN

## 2018-04-23 VITALS — SYSTOLIC BLOOD PRESSURE: 152 MMHG | DIASTOLIC BLOOD PRESSURE: 67 MMHG

## 2018-04-23 VITALS — DIASTOLIC BLOOD PRESSURE: 88 MMHG | SYSTOLIC BLOOD PRESSURE: 157 MMHG

## 2018-04-23 VITALS — DIASTOLIC BLOOD PRESSURE: 98 MMHG | SYSTOLIC BLOOD PRESSURE: 162 MMHG

## 2018-04-23 DIAGNOSIS — I73.9: ICD-10-CM
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
## 2018-04-23 DIAGNOSIS — S40.822A: Primary | ICD-10-CM

## 2018-04-23 DIAGNOSIS — Z95.820: ICD-10-CM
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
## 2018-04-23 DIAGNOSIS — Z87.81: ICD-10-CM

## 2018-04-23 DIAGNOSIS — Z88.5: ICD-10-CM

## 2018-04-23 DIAGNOSIS — R29.6: ICD-10-CM

## 2018-04-23 DIAGNOSIS — L60.3: ICD-10-CM

## 2018-04-23 DIAGNOSIS — B35.1: ICD-10-CM

## 2018-04-23 DIAGNOSIS — G93.40: ICD-10-CM

## 2018-04-23 DIAGNOSIS — S90.121A: ICD-10-CM
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
## 2018-04-23 DIAGNOSIS — G20: ICD-10-CM

## 2018-04-23 DIAGNOSIS — S67.21XA: ICD-10-CM

## 2018-04-23 DIAGNOSIS — Z96.642: ICD-10-CM

## 2018-04-23 DIAGNOSIS — E46: ICD-10-CM
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
## 2018-04-23 DIAGNOSIS — Z79.899: ICD-10-CM

## 2018-04-23 DIAGNOSIS — M62.82: ICD-10-CM

## 2018-04-23 LAB
ALBUMIN SERPL-MCNC: 3 G/DL (ref 3.4–5)
ALT SERPL-CCNC: 49 U/L (ref 30–65)
ANION GAP SERPL CALC-SCNC: 8 MMOL/L (ref 7–16)
AST SERPL-CCNC: 214 U/L (ref 15–37)
BASOPHILS NFR BLD AUTO: 0.5 % (ref 0–2)
BILIRUB SERPL-MCNC: 0.6 MG/DL
BUN SERPL-MCNC: 8 MG/DL (ref 7–18)
CALCIUM SERPL-MCNC: 8.8 MG/DL (ref 8.5–10.1)
CHLORIDE SERPL-SCNC: 105 MMOL/L (ref 98–107)
CO2 SERPL-SCNC: 27 MMOL/L (ref 21–32)
CREAT SERPL-MCNC: 1 MG/DL (ref 0.7–1.3)
EOSINOPHIL NFR BLD: 0.1 % (ref 0–3)
ERYTHROCYTE [DISTWIDTH] IN BLOOD BY AUTOMATED COUNT: 15.3 % (ref 10.5–14.5)
GLUCOSE SERPL-MCNC: 112 MG/DL (ref 74–106)
GRANULOCYTES NFR BLD MANUAL: 82.6 % (ref 36–66)
HCT VFR BLD CALC: 38.4 % (ref 42–52)
HGB BLD-MCNC: 12.9 GM/DL (ref 14–18)
LYMPHOCYTES NFR BLD AUTO: 9.3 % (ref 24–44)
MCH RBC QN AUTO: 29 PG (ref 26–34)
MCHC RBC AUTO-ENTMCNC: 33.8 G/DL (ref 28–37)
MCV RBC: 85.9 FL (ref 80–100)
MONOCYTES NFR BLD: 7.5 % (ref 1–8)
NEUTROPHILS # BLD: 8.1 THOU/UL (ref 1.4–8.2)
PLATELET # BLD: 212 THOU/UL (ref 150–400)
POTASSIUM SERPL-SCNC: 3.9 MMOL/L (ref 3.5–5.1)
PROT SERPL-MCNC: 7.3 G/DL (ref 6.4–8.2)
RBC # BLD AUTO: 4.46 MIL/UL (ref 4.5–6)
SODIUM SERPL-SCNC: 140 MMOL/L (ref 136–145)
WBC # BLD AUTO: 9.8 THOU/UL (ref 4–11)

## 2018-04-23 PROCEDURE — 10084: CPT

## 2018-04-24 VITALS — DIASTOLIC BLOOD PRESSURE: 72 MMHG | SYSTOLIC BLOOD PRESSURE: 123 MMHG

## 2018-04-24 VITALS — SYSTOLIC BLOOD PRESSURE: 176 MMHG | DIASTOLIC BLOOD PRESSURE: 98 MMHG

## 2018-04-24 VITALS — SYSTOLIC BLOOD PRESSURE: 131 MMHG | DIASTOLIC BLOOD PRESSURE: 82 MMHG

## 2018-04-24 VITALS — DIASTOLIC BLOOD PRESSURE: 93 MMHG | SYSTOLIC BLOOD PRESSURE: 145 MMHG

## 2018-04-24 VITALS — SYSTOLIC BLOOD PRESSURE: 133 MMHG | DIASTOLIC BLOOD PRESSURE: 80 MMHG

## 2018-04-24 LAB
ANION GAP SERPL CALC-SCNC: 5 MMOL/L (ref 7–16)
BUN SERPL-MCNC: 8 MG/DL (ref 7–18)
CALCIUM SERPL-MCNC: 8.6 MG/DL (ref 8.5–10.1)
CHLORIDE SERPL-SCNC: 103 MMOL/L (ref 98–107)
CO2 SERPL-SCNC: 27 MMOL/L (ref 21–32)
CREAT SERPL-MCNC: 0.8 MG/DL (ref 0.7–1.3)
ERYTHROCYTE [DISTWIDTH] IN BLOOD BY AUTOMATED COUNT: 15.2 % (ref 10.5–14.5)
GLUCOSE SERPL-MCNC: 110 MG/DL (ref 74–106)
HCT VFR BLD CALC: 36.9 % (ref 42–52)
HGB BLD-MCNC: 12.1 GM/DL (ref 14–18)
MCH RBC QN AUTO: 28.6 PG (ref 26–34)
MCHC RBC AUTO-ENTMCNC: 32.9 G/DL (ref 28–37)
MCV RBC: 87.1 FL (ref 80–100)
PLATELET # BLD: 176 THOU/UL (ref 150–400)
POTASSIUM SERPL-SCNC: 3.8 MMOL/L (ref 3.5–5.1)
RBC # BLD AUTO: 4.24 MIL/UL (ref 4.5–6)
SODIUM SERPL-SCNC: 135 MMOL/L (ref 136–145)
WBC # BLD AUTO: 9.5 THOU/UL (ref 4–11)

## 2018-04-25 VITALS — SYSTOLIC BLOOD PRESSURE: 139 MMHG | DIASTOLIC BLOOD PRESSURE: 93 MMHG

## 2018-04-25 VITALS — SYSTOLIC BLOOD PRESSURE: 110 MMHG | DIASTOLIC BLOOD PRESSURE: 65 MMHG

## 2018-04-25 VITALS — DIASTOLIC BLOOD PRESSURE: 72 MMHG | SYSTOLIC BLOOD PRESSURE: 116 MMHG

## 2018-04-25 LAB
ALBUMIN SERPL-MCNC: 2 G/DL (ref 3.4–5)
ALT SERPL-CCNC: 32 U/L (ref 30–65)
ANION GAP SERPL CALC-SCNC: 5 MMOL/L (ref 7–16)
AST SERPL-CCNC: 135 U/L (ref 15–37)
BACTERIA-REFLEX: (no result) /HPF
BASOPHILS NFR BLD AUTO: 0 % (ref 0–2)
BILIRUB SERPL-MCNC: 0.7 MG/DL
BILIRUB UR-MCNC: NEGATIVE MG/DL
BUN SERPL-MCNC: 8 MG/DL (ref 7–18)
CALCIUM SERPL-MCNC: 8.1 MG/DL (ref 8.5–10.1)
CHLORIDE SERPL-SCNC: 104 MMOL/L (ref 98–107)
CO2 SERPL-SCNC: 27 MMOL/L (ref 21–32)
COLOR UR: YELLOW
CREAT SERPL-MCNC: 0.8 MG/DL (ref 0.7–1.3)
EOSINOPHIL NFR BLD: 3 % (ref 0–3)
ERYTHROCYTE [DISTWIDTH] IN BLOOD BY AUTOMATED COUNT: 15.6 % (ref 10.5–14.5)
GLUCOSE SERPL-MCNC: 87 MG/DL (ref 74–106)
GRANULOCYTES NFR BLD MANUAL: 66 % (ref 36–66)
HCT VFR BLD CALC: 33.3 % (ref 42–52)
HGB BLD-MCNC: 11 GM/DL (ref 14–18)
KETONES UR STRIP-MCNC: NEGATIVE MG/DL
LYMPHOCYTES NFR BLD AUTO: 16 % (ref 24–44)
MCH RBC QN AUTO: 28.9 PG (ref 26–34)
MCHC RBC AUTO-ENTMCNC: 33.1 G/DL (ref 28–37)
MCV RBC: 87.3 FL (ref 80–100)
MONOCYTES NFR BLD: 15 % (ref 1–8)
NEUTROPHILS # BLD: 4 THOU/UL (ref 1.4–8.2)
PLATELET # BLD EST: NORMAL 10*3/UL
PLATELET # BLD: 139 THOU/UL (ref 150–400)
POTASSIUM SERPL-SCNC: 3.8 MMOL/L (ref 3.5–5.1)
PROT SERPL-MCNC: 5.9 G/DL (ref 6.4–8.2)
RBC # BLD AUTO: 3.82 MIL/UL (ref 4.5–6)
RBC # UR STRIP: (no result) /UL
RBC MORPH BLD: NORMAL
SODIUM SERPL-SCNC: 136 MMOL/L (ref 136–145)
SP GR UR STRIP: 1.02 (ref 1–1.03)
SQUAMOUS: (no result) /LPF (ref 0–3)
URINE CLARITY: CLEAR
URINE GLUCOSE-RANDOM*: NEGATIVE
URINE LEUKOCYTES-REFLEX: NEGATIVE
URINE NITRITE-REFLEX: NEGATIVE
URINE PROTEIN (DIPSTICK): (no result)
URINE WBC-REFLEX: (no result) /HPF (ref 0–5)
UROBILINOGEN UR STRIP-ACNC: 1 E.U./DL (ref 0.2–1)
WBC # BLD AUTO: 6 THOU/UL (ref 4–11)

## 2018-04-26 VITALS — DIASTOLIC BLOOD PRESSURE: 61 MMHG | SYSTOLIC BLOOD PRESSURE: 114 MMHG

## 2018-04-26 VITALS — DIASTOLIC BLOOD PRESSURE: 81 MMHG | SYSTOLIC BLOOD PRESSURE: 128 MMHG

## 2018-04-26 VITALS — DIASTOLIC BLOOD PRESSURE: 76 MMHG | SYSTOLIC BLOOD PRESSURE: 129 MMHG

## 2018-04-26 VITALS — SYSTOLIC BLOOD PRESSURE: 138 MMHG | DIASTOLIC BLOOD PRESSURE: 86 MMHG

## 2018-04-26 LAB
ANION GAP SERPL CALC-SCNC: 6 MMOL/L (ref 7–16)
BUN SERPL-MCNC: 7 MG/DL (ref 7–18)
CALCIUM SERPL-MCNC: 8.3 MG/DL (ref 8.5–10.1)
CHLORIDE SERPL-SCNC: 104 MMOL/L (ref 98–107)
CO2 SERPL-SCNC: 27 MMOL/L (ref 21–32)
CREAT SERPL-MCNC: 0.8 MG/DL (ref 0.7–1.3)
ERYTHROCYTE [DISTWIDTH] IN BLOOD BY AUTOMATED COUNT: 15.5 % (ref 10.5–14.5)
GLUCOSE SERPL-MCNC: 88 MG/DL (ref 74–106)
HCT VFR BLD CALC: 32.4 % (ref 42–52)
HGB BLD-MCNC: 10.7 GM/DL (ref 14–18)
MCH RBC QN AUTO: 28.9 PG (ref 26–34)
MCHC RBC AUTO-ENTMCNC: 32.9 G/DL (ref 28–37)
MCV RBC: 87.7 FL (ref 80–100)
PLATELET # BLD: 132 THOU/UL (ref 150–400)
POTASSIUM SERPL-SCNC: 4 MMOL/L (ref 3.5–5.1)
RBC # BLD AUTO: 3.7 MIL/UL (ref 4.5–6)
SODIUM SERPL-SCNC: 137 MMOL/L (ref 136–145)
WBC # BLD AUTO: 5.5 THOU/UL (ref 4–11)

## 2018-04-27 VITALS — DIASTOLIC BLOOD PRESSURE: 88 MMHG | SYSTOLIC BLOOD PRESSURE: 125 MMHG

## 2018-04-27 VITALS — SYSTOLIC BLOOD PRESSURE: 124 MMHG | DIASTOLIC BLOOD PRESSURE: 73 MMHG

## 2018-04-27 VITALS — DIASTOLIC BLOOD PRESSURE: 67 MMHG | SYSTOLIC BLOOD PRESSURE: 113 MMHG

## 2018-04-27 VITALS — SYSTOLIC BLOOD PRESSURE: 113 MMHG | DIASTOLIC BLOOD PRESSURE: 67 MMHG

## 2018-04-27 LAB
ANION GAP SERPL CALC-SCNC: 4 MMOL/L (ref 7–16)
BUN SERPL-MCNC: 8 MG/DL (ref 7–18)
CALCIUM SERPL-MCNC: 8.5 MG/DL (ref 8.5–10.1)
CHLORIDE SERPL-SCNC: 102 MMOL/L (ref 98–107)
CO2 SERPL-SCNC: 31 MMOL/L (ref 21–32)
CREAT SERPL-MCNC: 1 MG/DL (ref 0.7–1.3)
ERYTHROCYTE [DISTWIDTH] IN BLOOD BY AUTOMATED COUNT: 15.2 % (ref 10.5–14.5)
GLUCOSE SERPL-MCNC: 93 MG/DL (ref 74–106)
HCT VFR BLD CALC: 34.1 % (ref 42–52)
HGB BLD-MCNC: 11.2 GM/DL (ref 14–18)
MCH RBC QN AUTO: 28.6 PG (ref 26–34)
MCHC RBC AUTO-ENTMCNC: 33 G/DL (ref 28–37)
MCV RBC: 86.8 FL (ref 80–100)
PLATELET # BLD: 156 THOU/UL (ref 150–400)
POTASSIUM SERPL-SCNC: 4 MMOL/L (ref 3.5–5.1)
RBC # BLD AUTO: 3.93 MIL/UL (ref 4.5–6)
SODIUM SERPL-SCNC: 137 MMOL/L (ref 136–145)
WBC # BLD AUTO: 5.7 THOU/UL (ref 4–11)

## 2018-04-27 PROCEDURE — 0JBJ0ZZ EXCISION OF RIGHT HAND SUBCUTANEOUS TISSUE AND FASCIA, OPEN APPROACH: ICD-10-PCS | Performed by: PREVENTIVE MEDICINE

## 2018-04-28 VITALS — DIASTOLIC BLOOD PRESSURE: 81 MMHG | SYSTOLIC BLOOD PRESSURE: 133 MMHG

## 2018-04-28 VITALS — DIASTOLIC BLOOD PRESSURE: 71 MMHG | SYSTOLIC BLOOD PRESSURE: 118 MMHG

## 2018-04-28 VITALS — SYSTOLIC BLOOD PRESSURE: 133 MMHG | DIASTOLIC BLOOD PRESSURE: 81 MMHG

## 2018-05-22 ENCOUNTER — HOSPITAL ENCOUNTER (EMERGENCY)
Dept: HOSPITAL 35 - ER | Age: 58
Discharge: HOME | End: 2018-05-22
Payer: COMMERCIAL

## 2018-05-22 VITALS — WEIGHT: 205.01 LBS | HEIGHT: 70 IN | BODY MASS INDEX: 29.35 KG/M2

## 2018-05-22 DIAGNOSIS — G89.29: ICD-10-CM

## 2018-05-22 DIAGNOSIS — Z88.5: ICD-10-CM

## 2018-05-22 DIAGNOSIS — I89.0: Primary | ICD-10-CM

## 2018-05-22 LAB
ALBUMIN SERPL-MCNC: 2.8 G/DL (ref 3.4–5)
ALT SERPL-CCNC: 26 U/L (ref 30–65)
ANION GAP SERPL CALC-SCNC: 6 MMOL/L (ref 7–16)
AST SERPL-CCNC: 42 U/L (ref 15–37)
BASOPHILS NFR BLD AUTO: 0.9 % (ref 0–2)
BILIRUB SERPL-MCNC: 0.6 MG/DL
BILIRUB UR-MCNC: NEGATIVE MG/DL
BUN SERPL-MCNC: 8 MG/DL (ref 7–18)
CALCIUM SERPL-MCNC: 9 MG/DL (ref 8.5–10.1)
CHLORIDE SERPL-SCNC: 104 MMOL/L (ref 98–107)
CO2 SERPL-SCNC: 30 MMOL/L (ref 21–32)
COLOR UR: YELLOW
CREAT SERPL-MCNC: 0.8 MG/DL (ref 0.7–1.3)
EOSINOPHIL NFR BLD: 2.1 % (ref 0–3)
ERYTHROCYTE [DISTWIDTH] IN BLOOD BY AUTOMATED COUNT: 15 % (ref 10.5–14.5)
GLUCOSE SERPL-MCNC: 95 MG/DL (ref 74–106)
GRANULOCYTES NFR BLD MANUAL: 61.8 % (ref 36–66)
HCT VFR BLD CALC: 33.9 % (ref 42–52)
HGB BLD-MCNC: 11.1 GM/DL (ref 14–18)
KETONES UR STRIP-MCNC: NEGATIVE MG/DL
LYMPHOCYTES NFR BLD AUTO: 23.4 % (ref 24–44)
MCH RBC QN AUTO: 28.4 PG (ref 26–34)
MCHC RBC AUTO-ENTMCNC: 32.8 G/DL (ref 28–37)
MCV RBC: 86.7 FL (ref 80–100)
MONOCYTES NFR BLD: 11.8 % (ref 1–8)
NEUTROPHILS # BLD: 3.2 THOU/UL (ref 1.4–8.2)
PLATELET # BLD: 209 THOU/UL (ref 150–400)
POTASSIUM SERPL-SCNC: 3.8 MMOL/L (ref 3.5–5.1)
PROT SERPL-MCNC: 7.1 G/DL (ref 6.4–8.2)
RBC # BLD AUTO: 3.91 MIL/UL (ref 4.5–6)
RBC # UR STRIP: NEGATIVE /UL
SODIUM SERPL-SCNC: 140 MMOL/L (ref 136–145)
SP GR UR STRIP: 1.02 (ref 1–1.03)
TROPONIN I SERPL-MCNC: < 0.04 NG/ML (ref ?–0.06)
URINE CLARITY: CLEAR
URINE GLUCOSE-RANDOM*: NEGATIVE
URINE LEUKOCYTES-REFLEX: NEGATIVE
URINE NITRITE-REFLEX: NEGATIVE
URINE PROTEIN (DIPSTICK): NEGATIVE
UROBILINOGEN UR STRIP-ACNC: 0.2 E.U./DL (ref 0.2–1)
WBC # BLD AUTO: 5.2 THOU/UL (ref 4–11)

## 2018-05-30 ENCOUNTER — HOSPITAL ENCOUNTER (OUTPATIENT)
Dept: HOSPITAL 35 - PAIN | Age: 58
End: 2018-05-30
Payer: COMMERCIAL

## 2018-05-30 ENCOUNTER — HOSPITAL ENCOUNTER (OUTPATIENT)
Dept: HOSPITAL 35 - HYPER | Age: 58
End: 2018-05-30
Attending: PREVENTIVE MEDICINE
Payer: COMMERCIAL

## 2018-05-30 VITALS — SYSTOLIC BLOOD PRESSURE: 122 MMHG | DIASTOLIC BLOOD PRESSURE: 79 MMHG

## 2018-05-30 VITALS — BODY MASS INDEX: 30.06 KG/M2 | HEIGHT: 70 IN | WEIGHT: 210 LBS

## 2018-05-30 DIAGNOSIS — I73.9: ICD-10-CM

## 2018-05-30 DIAGNOSIS — I89.0: ICD-10-CM
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
## 2018-05-30 DIAGNOSIS — Z96.642: ICD-10-CM

## 2018-05-30 DIAGNOSIS — S81.001A: ICD-10-CM

## 2018-05-30 DIAGNOSIS — L89.313: Primary | ICD-10-CM
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
## 2018-05-30 DIAGNOSIS — L97.511: ICD-10-CM
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
## 2018-05-30 DIAGNOSIS — M25.552: ICD-10-CM

## 2018-05-30 DIAGNOSIS — G20: Primary | ICD-10-CM

## 2018-05-30 DIAGNOSIS — Z96.649: ICD-10-CM
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
## 2018-05-30 DIAGNOSIS — I87.2: ICD-10-CM

## 2018-06-27 ENCOUNTER — HOSPITAL ENCOUNTER (OUTPATIENT)
Dept: HOSPITAL 35 - HYPER | Age: 58
End: 2018-06-27
Payer: COMMERCIAL

## 2018-06-27 VITALS — SYSTOLIC BLOOD PRESSURE: 131 MMHG | DIASTOLIC BLOOD PRESSURE: 84 MMHG

## 2018-06-27 DIAGNOSIS — I73.9: ICD-10-CM
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
## 2018-06-27 DIAGNOSIS — S81.001D: ICD-10-CM

## 2018-06-27 DIAGNOSIS — G20: ICD-10-CM

## 2018-06-27 DIAGNOSIS — L97.511: ICD-10-CM

## 2018-06-27 DIAGNOSIS — I89.0: ICD-10-CM

## 2018-06-27 DIAGNOSIS — L89.313: Primary | ICD-10-CM

## 2018-06-27 DIAGNOSIS — I87.2: ICD-10-CM

## 2018-10-12 ENCOUNTER — HOSPITAL ENCOUNTER (OUTPATIENT)
Dept: HOSPITAL 35 - PAIN | Age: 58
End: 2018-10-12
Payer: COMMERCIAL

## 2018-10-12 VITALS — SYSTOLIC BLOOD PRESSURE: 135 MMHG | DIASTOLIC BLOOD PRESSURE: 87 MMHG

## 2018-10-12 VITALS — WEIGHT: 213 LBS | HEIGHT: 70 IN | BODY MASS INDEX: 30.49 KG/M2

## 2018-10-12 DIAGNOSIS — G20: Primary | ICD-10-CM

## 2018-10-12 DIAGNOSIS — I87.2: ICD-10-CM

## 2018-10-12 DIAGNOSIS — I89.0: ICD-10-CM

## 2018-10-12 DIAGNOSIS — Z96.642: ICD-10-CM

## 2018-10-12 DIAGNOSIS — R26.9: ICD-10-CM

## 2018-11-14 ENCOUNTER — HOSPITAL ENCOUNTER (OUTPATIENT)
Dept: HOSPITAL 35 - PAIN | Age: 58
End: 2018-11-14
Payer: COMMERCIAL

## 2018-11-14 VITALS — DIASTOLIC BLOOD PRESSURE: 91 MMHG | SYSTOLIC BLOOD PRESSURE: 128 MMHG

## 2018-11-14 DIAGNOSIS — M79.641: ICD-10-CM

## 2018-11-14 DIAGNOSIS — M79.605: Primary | ICD-10-CM

## 2018-11-14 DIAGNOSIS — M79.651: ICD-10-CM

## 2018-11-14 DIAGNOSIS — Z79.891: ICD-10-CM

## 2018-11-14 DIAGNOSIS — M79.604: ICD-10-CM

## 2018-11-14 DIAGNOSIS — M79.652: ICD-10-CM

## 2018-11-22 ENCOUNTER — HOSPITAL ENCOUNTER (EMERGENCY)
Dept: HOSPITAL 35 - ER | Age: 58
Discharge: HOME | End: 2018-11-22
Payer: COMMERCIAL

## 2018-11-22 VITALS — DIASTOLIC BLOOD PRESSURE: 74 MMHG | SYSTOLIC BLOOD PRESSURE: 113 MMHG

## 2018-11-22 VITALS — BODY MASS INDEX: 28.63 KG/M2 | HEIGHT: 70 IN | WEIGHT: 200 LBS

## 2018-11-22 DIAGNOSIS — K21.9: ICD-10-CM

## 2018-11-22 DIAGNOSIS — Z88.5: ICD-10-CM

## 2018-11-22 DIAGNOSIS — N48.1: Primary | ICD-10-CM

## 2018-11-22 DIAGNOSIS — G89.29: ICD-10-CM

## 2018-11-22 DIAGNOSIS — G20: ICD-10-CM

## 2018-11-22 DIAGNOSIS — I50.9: ICD-10-CM

## 2018-12-05 ENCOUNTER — HOSPITAL ENCOUNTER (EMERGENCY)
Dept: HOSPITAL 35 - ER | Age: 58
Discharge: HOME | End: 2018-12-05
Payer: COMMERCIAL

## 2018-12-05 VITALS — DIASTOLIC BLOOD PRESSURE: 61 MMHG | SYSTOLIC BLOOD PRESSURE: 97 MMHG

## 2018-12-05 VITALS — HEIGHT: 70 IN | BODY MASS INDEX: 29.35 KG/M2 | WEIGHT: 205.01 LBS

## 2018-12-05 DIAGNOSIS — N48.1: ICD-10-CM

## 2018-12-05 DIAGNOSIS — K21.9: ICD-10-CM

## 2018-12-05 DIAGNOSIS — G20: ICD-10-CM

## 2018-12-05 DIAGNOSIS — I50.9: ICD-10-CM

## 2018-12-05 DIAGNOSIS — Z88.5: ICD-10-CM

## 2018-12-05 DIAGNOSIS — G89.29: ICD-10-CM

## 2018-12-05 DIAGNOSIS — R31.9: ICD-10-CM

## 2018-12-05 DIAGNOSIS — Z96.642: ICD-10-CM

## 2018-12-05 DIAGNOSIS — N39.0: Primary | ICD-10-CM

## 2018-12-05 DIAGNOSIS — R53.1: ICD-10-CM

## 2018-12-05 LAB
ALBUMIN SERPL-MCNC: 3 G/DL (ref 3.4–5)
ALT SERPL-CCNC: 17 U/L (ref 30–65)
ANION GAP SERPL CALC-SCNC: 7 MMOL/L (ref 7–16)
APTT BLD: 27.2 SECONDS (ref 24.5–32.8)
AST SERPL-CCNC: 12 U/L (ref 15–37)
BASOPHILS NFR BLD AUTO: 1.2 % (ref 0–2)
BILIRUB SERPL-MCNC: 0.4 MG/DL
BILIRUB UR-MCNC: NEGATIVE MG/DL
BUN SERPL-MCNC: 8 MG/DL (ref 7–18)
CALCIUM SERPL-MCNC: 9.2 MG/DL (ref 8.5–10.1)
CHLORIDE SERPL-SCNC: 104 MMOL/L (ref 98–107)
CO2 SERPL-SCNC: 28 MMOL/L (ref 21–32)
COLOR UR: (no result)
CREAT SERPL-MCNC: 0.8 MG/DL (ref 0.7–1.3)
EOSINOPHIL NFR BLD: 4.3 % (ref 0–3)
ERYTHROCYTE [DISTWIDTH] IN BLOOD BY AUTOMATED COUNT: 14.3 % (ref 10.5–14.5)
GLUCOSE SERPL-MCNC: 84 MG/DL (ref 74–106)
GRANULOCYTES NFR BLD MANUAL: 55.6 % (ref 36–66)
HCT VFR BLD CALC: 40.9 % (ref 42–52)
HGB BLD-MCNC: 13.7 GM/DL (ref 14–18)
INR PPP: 1
KETONES UR STRIP-MCNC: NEGATIVE MG/DL
LYMPHOCYTES NFR BLD AUTO: 28.5 % (ref 24–44)
MAGNESIUM SERPL-MCNC: 2.1 MG/DL (ref 1.8–2.4)
MCH RBC QN AUTO: 29.1 PG (ref 26–34)
MCHC RBC AUTO-ENTMCNC: 33.4 G/DL (ref 28–37)
MCV RBC: 87.1 FL (ref 80–100)
MONOCYTES NFR BLD: 10.4 % (ref 1–8)
NEUTROPHILS # BLD: 3.4 THOU/UL (ref 1.4–8.2)
PLATELET # BLD: 290 THOU/UL (ref 150–400)
POTASSIUM SERPL-SCNC: 3.9 MMOL/L (ref 3.5–5.1)
PROT SERPL-MCNC: 7.5 G/DL (ref 6.4–8.2)
PROTHROMBIN TIME: 10.2 SECONDS (ref 9.3–11.4)
RBC # BLD AUTO: 4.7 MIL/UL (ref 4.5–6)
RBC # UR STRIP: (no result) /UL
RBC #/AREA URNS HPF: (no result) /HPF (ref 0–2)
SODIUM SERPL-SCNC: 139 MMOL/L (ref 136–145)
SP GR UR STRIP: 1.02 (ref 1–1.03)
SQUAMOUS: (no result) /LPF (ref 0–3)
TROPONIN I SERPL-MCNC: <0.06 NG/ML (ref ?–0.06)
URINE CLARITY: (no result)
URINE GLUCOSE-RANDOM*: NEGATIVE
URINE LEUKOCYTES-REFLEX: (no result)
URINE NITRITE-REFLEX: POSITIVE
URINE PROTEIN (DIPSTICK): (no result)
UROBILINOGEN UR STRIP-ACNC: 1 E.U./DL (ref 0.2–1)
WBC # BLD AUTO: 6.1 THOU/UL (ref 4–11)

## 2019-01-11 ENCOUNTER — HOSPITAL ENCOUNTER (OUTPATIENT)
Dept: HOSPITAL 35 - PAIN | Age: 59
End: 2019-01-11
Attending: CLINICAL NURSE SPECIALIST
Payer: COMMERCIAL

## 2019-01-11 VITALS — SYSTOLIC BLOOD PRESSURE: 116 MMHG | DIASTOLIC BLOOD PRESSURE: 84 MMHG

## 2019-01-11 DIAGNOSIS — Z86.79: ICD-10-CM

## 2019-01-11 DIAGNOSIS — R26.9: ICD-10-CM

## 2019-01-11 DIAGNOSIS — G20: Primary | ICD-10-CM

## 2019-01-11 DIAGNOSIS — I89.0: ICD-10-CM

## 2019-01-11 DIAGNOSIS — Z96.642: ICD-10-CM

## 2019-01-11 NOTE — NUR
Pain Clinic Assessment:
 
1. History of Osteoarthritis:
NO
   History of Rheumatoid Arthritis:
NO
 
2. Height:  ft.  in.  cm.
   Weight:  lb.  oz.  kg.
   Patient's BMI:
 
3. Vital Signs:
   BP: 116/84 Pulse: 85 Resp: 16
   Temp:  02 Sat: 97 ECG Mon:
 
4. Pain Intensity: 5
 
5. Fall Risk:
   Dizziness: N  Needs help standing or walking: Y
   Fallen in the last 3 months: N
   Fall risk comments:
 
 
6. Patient on Blood Thinner: None
 
7. History of Hypertension: Y
 
8. Opioid Therapy greater than 6 weeks: Y
   Opiate Contract Signed: 06/23/16
 
9. Risk Assessment Tool Provided: 0 LOW RISK
 
10. Functional Assessment Tool: 40/70
 
11. Recreational Drug Use: Never Drug Type:
    Tobacco Use: Never Smoker Tobacco Type:
       Amount or Packs/day:  How Many Years:
    Alcohol Use: No  Frequency:  Quant:

## 2019-01-14 NOTE — HPC
[facility]
[address]
[place]   [address]                     PAIN MANAGEMENT CONSULTATION  
_______________________________________________________________________________
 
Name:       VIOLA PARR                Room #:                     REG [facility] 
DONOVAN#:      [id number]                       Account #:      [id number]  
Admission:  01/11/19    Attend Phys:    Codie Noriega     
Discharge:              Date of Birth:  [birth year]  
                                                          Report #: [id number]
                                                                    [id number]   
_______________________________________________________________________________
THIS REPORT FOR:   //name//                          
 
CC: Codie Noriega
    Pennie Strongz
 
DATE OF SERVICE:  01/11/2019
 
 
CHIEF COMPLAINT:  This patient has chronic pain associated with Parkinson's
disease.
 
HISTORY OF PRESENT ILLNESS:  This is a 58-year-old gentleman who has been
followed in the pain clinic for his chronic pain related to his Parkinson's
disease.  He tells me with his sister who is a caregiver today that he had been
in the Emergency Room a couple times since last visit.  He had penile swelling
and then he had urinary tract infection.  Those have both since cleared, but
today he does present with a very red, watery left eye looking like
conjunctivitis.  He is going to see his primary care after our visit today.  He
tells me it just started yesterday and has gotten significantly worse when he
got up this morning.  The patient tells me that his pain score is 5/10 today,
worse in his legs, thighs and hands, worse with movements or sitting for a long
time.  His medication is very helpful.  He denies any constipation since he does
take some medications to help with that occasionally and does not have any
daytime sleepiness related to his narcotic use he feels.  He feels that he is on
a good regimen with his medications and would just like a refill today.
 
ALLERGIES:  MORPHINE.
 
LIST OF MEDICATIONS:  OxyIR 10 mg twice a day, oxycodone 5 mg every 8 hours as
needed, amoxicillin 500 mg 3 times a day, Protonix 40 mg daily, tamsulosin 0.4
mg daily, Requip 0.25 three times a day, vitamin B12 daily, vitamin D3 daily,
Colace daily, Lasix 40 mg twice a day, potassium 20 mEq daily,
carbidopa/levodopa 25/100 three times a day.
 
PQRS:
1.  He denies rheumatoid arthritis and does have some osteoarthritis in his hips
and has had replacement.
2.  Height is 5 feet 10 inches, weight is 205 per the patient.
3.  Vital signs:  Blood pressure 116/84, pulse is 85, respirations 16, oxygen
sat 97%.
4.  Pain score is 5/10.
5.  Dizziness, he denies dizziness.  He is in a wheelchair today, has not fallen
in the last 3 months.
6.  The patient is not on any blood thinners, does take medicine for
hypertension.
7.  His opiate therapy is greater than 6 weeks, therefore an opioid contract is
 
 
 
[place] [address]                     PAIN MANAGEMENT CONSULTATION  
_______________________________________________________________________________
 
Name:       VIOLA PARR                Room #:                     REG ARACELI MAN#:      [id number]                       Account #:      [id number]  
Admission:  01/11/19    Attend Phys:    Codie Noriega     
Discharge:              Date of Birth:  [birth year]  
                                                          Report #: [id number]
                                                                    [id number]   
_______________________________________________________________________________
on the chart.
8.  His risk assessment tool is low.  His functional assessment is 40/70.
9.  He denies recreational drug use.  He is not a smoker and does not drink
alcohol.
 
We did check the prescription monitoring system.  He fills downstairs in the
medical pharmacy, filling appropriately with his medication.  He tells me that
he does safeguard his medications.  His sisters help him with his medications.
 
PHYSICAL EXAMINATION:
GENERAL:  This patient is well-developed, well-nourished black male.  He appears
his stated age.  Alert and orientated with good sense of humor.  He is in a
wheelchair today.  One of his sisters is present.
HEENT:  Normocephalic, atraumatic.  Left eye has very reddened conjunctivae and
has drainage watering, right eye slightly reddened.
NECK:  Without adenopathy.  Decreased range of motion secondary to his
Parkinson's disease.
MUSCULOSKELETAL:  The patient is in a wheelchair.  His right arm has limited
movement obvious loss of muscle bulk in the affected arm, continues to have
lower extremity edema secondary to lymphedema history.
 
We reviewed the fact that opiate medications are being used to provide analgesia
adequate to support activities of daily living, not attempting to achieve a
specific pain score on the 0-10 Visual Analog Scale.  The current opiate
medications are providing sufficient analgesia to allow the patient to
participate in activities of daily living.  The patient is not exhibiting any
aberrant behavior suggestive of drug diversion.  The patient is not having any
adverse reactions to medications.  The patient is not suffering from daytime
somnolence or mental acuity changes.  The patient is managing opiate-induced
constipation with appropriate over-the-counter agents and dietary
considerations.  The patient was counseled on concern for caution with operating
a motor vehicle while using opiate medications.
 
A physical exam was performed and the patient's functional status was evaluated.
 All patients with back pain were advised against the bed rest greater than 4
days and were advised to return to normal activities.  Pain score assessment was
noted and the treatment plan was reviewed with the patient.  All current
medications, both prescribed and OTC were reviewed and reconciled on the
electronic medical record.  Tobacco screening was accomplished and smoking
cessation was advised when indicated.  BMI was noted and diet/exercise
modification was recommended for all patients following outside normal
parameters.
 
I reviewed with the patient today their responsibilities to safeguard
prescription medications, reviewed their responsibility to utilize medications
only as prescribed by the physician.  They are to seek and receive pain
 
 
 
[facility]
[address]
[place]   [address]                     PAIN MANAGEMENT CONSULTATION  
_______________________________________________________________________________
 
Name:       KESHAVVIOLA                [facility] #[facility]                     [facility]      [id number]                       Account #:      [id number]  
Admission:  01/11/19    Attend Phys:    Codie Noriega     
Discharge:              Date of Birth:  [birth year]  
                                                          Report #: [id number]
                                                                    [id number]   
_______________________________________________________________________________
medications only from 1 physician group ([facility]).  They are to use 1
pharmacy and keep the clinic informed if they change pharmacies.  Their
responsibilities include making followup visits in a timely fashion and to avoid
abrupt discontinuation of medication usage.  Their responsibilities further
include bringing their medications (bottles from the pharmacy with residual
pills) to the visit for possible confirmation of pill counts and the patient
understands it is their responsibility to submit to random drug screens to
ensure both that the medications prescribed are present, and that no other
controlled substances are present.  All prescriptions provided today were
generated electronically.
 
ASSESSMENT:
1.  Parkinson's disease.
2.  Chronic lymphedema in his lower extremities.
3.  History of venous insufficiency.
4.  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
5.  Left hip replacement, status post osteoarthritic changes.
6.  Gait and movement problems secondary to Parkinson's disease.
7.  Possible pink eye in his left eye.
 
PLAN:  We discussed treatment options today.  The patient tells me that he is
able to function well with his current medication regimen.  He feels that his
pain is under good control.  Scripts given for oxycodone IR 10 mg twice a day,
#60 for today and 4-week, oxycodone 5 mg every 8 hours #50 for today and 4-week
release.  The patient will follow up in 2 months.  Care given today in
collaboration with Dr. Chance Archer.
 
 
 
 
 
 
 
 
 
 
 
 
 
 
 
 
 
 
  <ELECTRONICALLY SIGNED>
   By: Codie Noriega             
  01/14/19     0710
D: 01/11/19 0920                           _____________________________________
T: 01/11/19 1113                           Codie Noriega               /nt

## 2019-01-22 ENCOUNTER — HOSPITAL ENCOUNTER (EMERGENCY)
Dept: HOSPITAL 35 - ER | Age: 59
LOS: 1 days | Discharge: HOME | End: 2019-01-23
Payer: COMMERCIAL

## 2019-01-22 VITALS — BODY MASS INDEX: 35.07 KG/M2 | HEIGHT: 70 IN | WEIGHT: 245 LBS

## 2019-01-22 DIAGNOSIS — Z88.5: ICD-10-CM

## 2019-01-22 DIAGNOSIS — K21.9: ICD-10-CM

## 2019-01-22 DIAGNOSIS — I50.9: ICD-10-CM

## 2019-01-22 DIAGNOSIS — R51: Primary | ICD-10-CM

## 2019-01-22 DIAGNOSIS — G89.29: ICD-10-CM

## 2019-01-22 DIAGNOSIS — I73.9: ICD-10-CM

## 2019-01-22 DIAGNOSIS — G20: ICD-10-CM

## 2019-01-23 VITALS — DIASTOLIC BLOOD PRESSURE: 65 MMHG | SYSTOLIC BLOOD PRESSURE: 99 MMHG

## 2019-03-04 ENCOUNTER — HOSPITAL ENCOUNTER (INPATIENT)
Dept: HOSPITAL 35 - ER | Age: 59
LOS: 3 days | Discharge: NURSING, REHAB OR LONG-TERM CARE | DRG: 689 | End: 2019-03-07
Attending: INTERNAL MEDICINE | Admitting: INTERNAL MEDICINE
Payer: COMMERCIAL

## 2019-03-04 VITALS — DIASTOLIC BLOOD PRESSURE: 84 MMHG | SYSTOLIC BLOOD PRESSURE: 123 MMHG

## 2019-03-04 VITALS — SYSTOLIC BLOOD PRESSURE: 151 MMHG | DIASTOLIC BLOOD PRESSURE: 95 MMHG

## 2019-03-04 VITALS — DIASTOLIC BLOOD PRESSURE: 75 MMHG | SYSTOLIC BLOOD PRESSURE: 116 MMHG

## 2019-03-04 VITALS — HEIGHT: 70 IN | BODY MASS INDEX: 34.4 KG/M2 | WEIGHT: 240.3 LBS

## 2019-03-04 VITALS — SYSTOLIC BLOOD PRESSURE: 114 MMHG | DIASTOLIC BLOOD PRESSURE: 67 MMHG

## 2019-03-04 DIAGNOSIS — Z88.6: ICD-10-CM

## 2019-03-04 DIAGNOSIS — I73.9: ICD-10-CM

## 2019-03-04 DIAGNOSIS — G89.4: ICD-10-CM

## 2019-03-04 DIAGNOSIS — Z79.899: ICD-10-CM

## 2019-03-04 DIAGNOSIS — K21.9: ICD-10-CM

## 2019-03-04 DIAGNOSIS — E43: ICD-10-CM

## 2019-03-04 DIAGNOSIS — Z46.6: ICD-10-CM

## 2019-03-04 DIAGNOSIS — B96.5: ICD-10-CM

## 2019-03-04 DIAGNOSIS — N39.0: Primary | ICD-10-CM

## 2019-03-04 DIAGNOSIS — G20: ICD-10-CM

## 2019-03-04 DIAGNOSIS — E66.01: ICD-10-CM

## 2019-03-04 DIAGNOSIS — I50.9: ICD-10-CM

## 2019-03-04 DIAGNOSIS — Z16.24: ICD-10-CM

## 2019-03-04 DIAGNOSIS — Z96.642: ICD-10-CM

## 2019-03-04 DIAGNOSIS — Z95.820: ICD-10-CM

## 2019-03-04 LAB
ALBUMIN SERPL-MCNC: 3 G/DL (ref 3.4–5)
ALT SERPL-CCNC: 13 U/L (ref 30–65)
ANION GAP SERPL CALC-SCNC: 8 MMOL/L (ref 7–16)
AST SERPL-CCNC: 14 U/L (ref 15–37)
BASOPHILS NFR BLD AUTO: 0.7 % (ref 0–2)
BILIRUB DIRECT SERPL-MCNC: < 0.1 MG/DL
BILIRUB SERPL-MCNC: 0.3 MG/DL
BILIRUB UR-MCNC: NEGATIVE MG/DL
BUN SERPL-MCNC: 6 MG/DL (ref 7–18)
CALCIUM SERPL-MCNC: 9.1 MG/DL (ref 8.5–10.1)
CHLORIDE SERPL-SCNC: 105 MMOL/L (ref 98–107)
CO2 SERPL-SCNC: 30 MMOL/L (ref 21–32)
COLOR UR: YELLOW
CREAT SERPL-MCNC: 0.7 MG/DL (ref 0.7–1.3)
EOSINOPHIL NFR BLD: 6 % (ref 0–3)
ERYTHROCYTE [DISTWIDTH] IN BLOOD BY AUTOMATED COUNT: 15 % (ref 10.5–14.5)
GLUCOSE SERPL-MCNC: 76 MG/DL (ref 74–106)
GRANULOCYTES NFR BLD MANUAL: 60.1 % (ref 36–66)
HCT VFR BLD CALC: 38.3 % (ref 42–52)
HGB BLD-MCNC: 12.6 GM/DL (ref 14–18)
HYALINE CASTS #/AREA URNS LPF: (no result) /LPF
KETONES UR STRIP-MCNC: NEGATIVE MG/DL
LYMPHOCYTES NFR BLD AUTO: 22.1 % (ref 24–44)
MCH RBC QN AUTO: 28.7 PG (ref 26–34)
MCHC RBC AUTO-ENTMCNC: 32.8 G/DL (ref 28–37)
MCV RBC: 87.6 FL (ref 80–100)
MONOCYTES NFR BLD: 11.1 % (ref 1–8)
MUCUS: (no result) STRN/LPF
NEUTROPHILS # BLD: 3.7 THOU/UL (ref 1.4–8.2)
PLATELET # BLD: 301 THOU/UL (ref 150–400)
POTASSIUM SERPL-SCNC: 3.5 MMOL/L (ref 3.5–5.1)
PROT SERPL-MCNC: 7.5 G/DL (ref 6.4–8.2)
RBC # BLD AUTO: 4.38 MIL/UL (ref 4.5–6)
RBC # UR STRIP: (no result) /UL
RBC #/AREA URNS HPF: (no result) /HPF (ref 0–2)
SODIUM SERPL-SCNC: 143 MMOL/L (ref 136–145)
SP GR UR STRIP: 1.02 (ref 1–1.03)
SQUAMOUS: (no result) /LPF (ref 0–3)
URINE CLARITY: (no result)
URINE GLUCOSE-RANDOM*: NEGATIVE
URINE LEUKOCYTES-REFLEX: (no result)
URINE NITRITE-REFLEX: POSITIVE
URINE PROTEIN (DIPSTICK): (no result)
URINE WBC-REFLEX: (no result) /HPF (ref 0–5)
UROBILINOGEN UR STRIP-ACNC: 0.2 E.U./DL (ref 0.2–1)
WBC # BLD AUTO: 6.1 THOU/UL (ref 4–11)

## 2019-03-04 PROCEDURE — 27000 TENOTOMY ADDUCTOR HIP PERQ: CPT

## 2019-03-04 PROCEDURE — 10783: CPT

## 2019-03-05 VITALS — DIASTOLIC BLOOD PRESSURE: 86 MMHG | SYSTOLIC BLOOD PRESSURE: 133 MMHG

## 2019-03-05 VITALS — DIASTOLIC BLOOD PRESSURE: 81 MMHG | SYSTOLIC BLOOD PRESSURE: 132 MMHG

## 2019-03-05 VITALS — DIASTOLIC BLOOD PRESSURE: 75 MMHG | SYSTOLIC BLOOD PRESSURE: 124 MMHG

## 2019-03-05 VITALS — DIASTOLIC BLOOD PRESSURE: 80 MMHG | SYSTOLIC BLOOD PRESSURE: 115 MMHG

## 2019-03-05 LAB
ANION GAP SERPL CALC-SCNC: 10 MMOL/L (ref 7–16)
BUN SERPL-MCNC: 6 MG/DL (ref 7–18)
CALCIUM SERPL-MCNC: 8.7 MG/DL (ref 8.5–10.1)
CHLORIDE SERPL-SCNC: 105 MMOL/L (ref 98–107)
CO2 SERPL-SCNC: 28 MMOL/L (ref 21–32)
CREAT SERPL-MCNC: 0.8 MG/DL (ref 0.7–1.3)
GLUCOSE SERPL-MCNC: 89 MG/DL (ref 74–106)
POTASSIUM SERPL-SCNC: 3.9 MMOL/L (ref 3.5–5.1)
SODIUM SERPL-SCNC: 143 MMOL/L (ref 136–145)

## 2019-03-05 NOTE — NUR
Consulted, no reason given. Pt on 2 gram sodium diet. Wt documented 220-240
lbs, . Reports pretty good appetite, intake % at breakfast. No
recent wt changes. Considered low risk at this time.

## 2019-03-05 NOTE — NUR
PT ADMITTED RELATED TO UTI, FAILED OP THERAPY. CM REVIEWED CHART AND SPOKE
WITH CARE TEAM. PT IS FAMILIAR TO CM FROM PREVIOUS ADMISSION. CM MET WITH PT
AND HIS MOTHER AT BEDSIDE THIS DAY. PT IS A&O X4. CM ROLE INTRODUCED. PT
INDICATED HE LIVES IN THE BASEMENT APARTMENT APARTMENT OF HIS MOTHER'S HOUSE
WITH 4 STEPS DOWN. PT'S SISTER IS [facility]. PT HAS FWW,
WHEELCHAIR, SHOWER CHAIR, FULL ELECTRIC HOSPITAL BED. PT INDICATED HE HAD BEEN
ON SERVICE WITH [facility] AND RECIEVING LYMBHADEMA THERAPY PTA.
[facility] HAD CALLED AND INDICATED THAT THEY WOULD NOT BE ABLE TO ACCEPT PT
BACK FOR SERVICES UPON DISCHARGE DUE TO NONCOMPLIANCE. PT INDICATED HE PLANS
TO RETURN HOME ONCE MEDICALLY STABLE. CM TO FOLLOW AS INDICATED WITH DC
PLANNING.

## 2019-03-06 VITALS — DIASTOLIC BLOOD PRESSURE: 72 MMHG | SYSTOLIC BLOOD PRESSURE: 140 MMHG

## 2019-03-06 VITALS — DIASTOLIC BLOOD PRESSURE: 99 MMHG | SYSTOLIC BLOOD PRESSURE: 146 MMHG

## 2019-03-06 VITALS — DIASTOLIC BLOOD PRESSURE: 97 MMHG | SYSTOLIC BLOOD PRESSURE: 129 MMHG

## 2019-03-06 NOTE — NUR
PT. AOX4; REQUESTED 10 MG PRN PAIN MEDICATION EARLY ON THE NIGHT WITH HS
MEDICATION; REFUSED THROUGH THE NIGHT TO BE TURNED; ST. "THIS IS HOW I AM AT
HOME"; EDUCATED ABOUT THE NEED TO TURN FROM SIDE TO SIDE; REFUSED; ABLE TO
REST DURING THE NIGHT WITH EYES CLOSE; UPSET EARLY ON THE MORNING BECAUSE WAS
WOKE UP FOR VS; EXPLAINED THE NEED OF VS; REFUSED TO BE TURNED; REQUESTED PRN
PAIN MEDICATION AND A FACIAL WARM TOWEL; MEDICATION AND TOWEL PROVIDED;
ASSESSMENT AS CHARGED; FOLLOWING POC; WILL PASS IN REPORT.

## 2019-03-06 NOTE — NUR
REFERRAL SENT TO [facility] FOR THEM TO CHECK BENEFITS.  COST WOULD BE $1.25 FOR
THE DRUG AND $30 PER DAY FOR SUPPLIES FOR TOTAL OF $211.25 WEEKLY.

## 2019-03-06 NOTE — NUR
CONSULTED TO PLACE A PICC FOR THIS PATIENT. NOTIFIED BY THE PATIENTS NURSE
THAT THE PATIENT IS REFUSING PICC PLACEMENT. SPOKE TO THE PATIENT IN DETAIL
ABOUT PICC PLACEMENT AND ANSWERED ALL QUESTIONS. HE CONTINUES TO REFUSE LINE
PLACEMENT AT THIS TIME AND IS ASKING TO SPEAK TO HIS DOCTOR AND SISTERS BEFORE
CONSENTING TO PICC PLACEMENT. WE WILL RETURN TO PLACE LINE IF THE PATIENT
CHANGES HIS MIND AND CONSENTS TO LINE

## 2019-03-06 NOTE — NUR
PT ASSESSED AT START OF SHIFT. PT AGREED TO TURN ON SIDE FOR ME TO CHECK HIS
SKIN. WHILE ON HIS SIDE I PROPPED HIM W/ PILLOWS. PT WAS NOT HAPPY AND ONLY
STAYED OVER 30 MIN. PT REFUSED TO TURN THE REST OF THE SHIFT EVEN W/
EXPLANATION OF IMPORTANCE FOR PREVENTION OF BED SORES. NOTIFIED WOUND CARE
NURSE AND LOW AIR LOSS PUMP ORDERED FOR BED. WILL CONTINUE TO  PT TO
TURN. ORDER FOR PT TO HAVE PICC PLACEMENT BY DR. COOK AND PT REFUSING. DR. COOK TALKED W/ HIM AND STATED PT AGREED TO HAVE PICC. IV NURSE TALKED W/ PT
FOR CONSENT AND HE IS REFUSING.

## 2019-03-06 NOTE — NUR
FAXED REFERRAL TO [facility] FOR IV ABX SPOKE WITH NATY FROM ADM. AND HE
WILL REVIEW REFERRAL AND F/U WITH ME IN THE AM. DCP TO FOLLOW.

## 2019-03-07 VITALS — SYSTOLIC BLOOD PRESSURE: 120 MMHG | DIASTOLIC BLOOD PRESSURE: 79 MMHG

## 2019-03-07 VITALS — DIASTOLIC BLOOD PRESSURE: 99 MMHG | SYSTOLIC BLOOD PRESSURE: 142 MMHG

## 2019-03-07 PROCEDURE — 02H633Z INSERTION OF INFUSION DEVICE INTO RIGHT ATRIUM, PERCUTANEOUS APPROACH: ICD-10-PCS | Performed by: INTERNAL MEDICINE

## 2019-03-07 NOTE — NUR
REP. FROM [facility] HERE EARLIER AND THEY ARE ABLE TO ACCEPT PT TODAY.  [facility] TO
ARRANGE STRETCHER VAN AROUND 1830 TODAY.  PT TO EAT DINNER HERE.  MOM IN ROOM
AND SISTERS ARE COMING AROUND 1700 TODAY.

## 2019-03-07 NOTE — NUR
PT. DISCHARGING TODAY TO [facility] FAXED DC ORDERS/SUMMARY TO FACILITY LEFT MS
WITH DON ODOM. SHE WILL NOTIFY  OF TIME OF TRANSPORT. CHART COPY PER US.

## 2019-03-07 NOTE — NUR
TURNED Q2HRS. INITIALLY REFUSED REPOSITIONING BUT DID COOPERATE AFTER
DISCUSSION OF BENEFITS OF PRESSURE RELIEF. REQUIRED BOTH PRN AND SCHEDULED
OXYCODONE FOR CHRONIC GENERALIZED PAIN. PATIENT AGREEABLE TO HAVING PICC LINE
INSERTED THURSDAY MORNING.

## 2019-03-07 NOTE — NUR
CONSULTED TO PLACE A PICC FOR PATIENT DISCHARGING WITH HOME IV ANTIBIOTICS.
PATIENT IS NOW CONSENTING TO THE LINE PLACEMENT. PROCEDURE AS WELL AS BENIFITS
AND RISK OF LINE PLACEMENT DISCUSSED AND HE VERBALIZED UNDERSTANDING. THE
RIGHT UPPER ARM BASILIC WAS WIDLEY PATENT. A #4F SINGLE LUMEN POER PICC WAS
PLACED PER HOSPITAL POLICY AFTER A BEDSIDE TIMEOUT WAS COMPLETE. LINE WAS
TRIMMED AND ADVANCED WITHOUT DIFFICULTY. A STAT CHEST XRAY SHOWING LINE DEEP,
LINE WITHDREW 5CM AND A SECOND XRAY ORDERED TO CONFIRM PLACEMENT

## 2019-03-07 NOTE — NUR
ASSESMENT COMPLETED. VSS. A/O. C/O PAIN MANAGED BY MEDS AS ORDERED- SEE MAR.
NO NOTED SOA. POOR APPETITE. DENIES NV. PLANS FOR PICC PLACEMENT TODAY.
ANTICIPATE DC TO FACILITY. PT COMPLIANT WITH CARES TODAY. REPOSITIONED AS
ORDERED. WILL CONT. TO MONITOR.

## 2019-03-07 NOTE — NUR
S/W PT, PT'S MOM AND SISTER MOY IN ROOM AND THEY ALL WISH FOR PT TO GO TO
[facility].  CALLED AND LEFT MESSAGE FOR [facility] IN ADMISSIONS AND FAXED FACE SHEET TO
THEM.  IF [facility] IS NOT ABLE TO ACCEPT PT TEHN THEY ARE INTERESTED IN [facility].  THEY DO NOT THINK PT CAN COME HOME WHILE HE IS RECEIVING THE IV ABX.
THEY WANT HIM MONITORED MORE CLOSELY WHILE HE HAS THE PICC LINE AND WOULD LIKE
FOR HIM TO BE STRONGER BEFORTE RETURNING BACK HOME AS WELL.

## 2019-04-01 ENCOUNTER — HOSPITAL ENCOUNTER (EMERGENCY)
Dept: HOSPITAL 35 - ER | Age: 59
Discharge: HOME | End: 2019-04-01
Payer: COMMERCIAL

## 2019-04-01 VITALS — HEIGHT: 70 IN | WEIGHT: 230.01 LBS | BODY MASS INDEX: 32.93 KG/M2

## 2019-04-01 VITALS — SYSTOLIC BLOOD PRESSURE: 121 MMHG | DIASTOLIC BLOOD PRESSURE: 77 MMHG

## 2019-04-01 DIAGNOSIS — Z88.5: ICD-10-CM

## 2019-04-01 DIAGNOSIS — K21.9: ICD-10-CM

## 2019-04-01 DIAGNOSIS — R19.7: Primary | ICD-10-CM

## 2019-04-01 DIAGNOSIS — I50.9: ICD-10-CM

## 2019-04-01 DIAGNOSIS — G89.29: ICD-10-CM

## 2019-04-01 DIAGNOSIS — G20: ICD-10-CM

## 2019-04-01 LAB
ALBUMIN SERPL-MCNC: 2.5 G/DL (ref 3.4–5)
ALT SERPL-CCNC: 25 U/L (ref 30–65)
ANION GAP SERPL CALC-SCNC: 11 MMOL/L (ref 7–16)
AST SERPL-CCNC: 18 U/L (ref 15–37)
BACTERIA-REFLEX: (no result) /HPF
BILIRUB DIRECT SERPL-MCNC: 0.1 MG/DL
BILIRUB SERPL-MCNC: 0.3 MG/DL
BILIRUB UR-MCNC: NEGATIVE MG/DL
BUN SERPL-MCNC: 3 MG/DL (ref 7–18)
CALCIUM OXALATE: (no result) /LPF
CALCIUM SERPL-MCNC: 9.1 MG/DL (ref 8.5–10.1)
CHLORIDE SERPL-SCNC: 104 MMOL/L (ref 98–107)
CO2 SERPL-SCNC: 26 MMOL/L (ref 21–32)
COLOR UR: YELLOW
CREAT SERPL-MCNC: 0.7 MG/DL (ref 0.7–1.3)
EOSINOPHIL NFR BLD: 2 % (ref 0–3)
ERYTHROCYTE [DISTWIDTH] IN BLOOD BY AUTOMATED COUNT: 14.9 % (ref 10.5–14.5)
GLUCOSE SERPL-MCNC: 82 MG/DL (ref 74–106)
GRANULOCYTES NFR BLD MANUAL: 66 % (ref 36–66)
HCT VFR BLD CALC: 32.2 % (ref 42–52)
HGB BLD-MCNC: 10.6 GM/DL (ref 14–18)
HYALINE CASTS #/AREA URNS LPF: (no result) /LPF
KETONES UR STRIP-MCNC: NEGATIVE MG/DL
LIPASE: 143 U/L (ref 73–393)
LYMPHOCYTES NFR BLD AUTO: 26 % (ref 24–44)
MCH RBC QN AUTO: 28.1 PG (ref 26–34)
MCHC RBC AUTO-ENTMCNC: 33 G/DL (ref 28–37)
MCV RBC: 85.1 FL (ref 80–100)
MONOCYTES NFR BLD: 6 % (ref 1–8)
MUCUS: (no result) STRN/LPF
NEUTROPHILS # BLD: 4.1 THOU/UL (ref 1.4–8.2)
PLATELET # BLD: 669 THOU/UL (ref 150–400)
POTASSIUM SERPL-SCNC: 3.2 MMOL/L (ref 3.5–5.1)
PROT SERPL-MCNC: 8 G/DL (ref 6.4–8.2)
RBC # BLD AUTO: 3.78 MIL/UL (ref 4.5–6)
RBC # UR STRIP: (no result) /UL
RBC MORPH BLD: NORMAL
SODIUM SERPL-SCNC: 141 MMOL/L (ref 136–145)
SP GR UR STRIP: 1.02 (ref 1–1.03)
SQUAMOUS: (no result) /LPF (ref 0–3)
URINE CLARITY: (no result)
URINE GLUCOSE-RANDOM*: NEGATIVE
URINE LEUKOCYTES-REFLEX: (no result)
URINE NITRITE-REFLEX: POSITIVE
URINE PROTEIN (DIPSTICK): (no result)
URINE WBC-REFLEX: (no result) /HPF (ref 0–5)
UROBILINOGEN UR STRIP-ACNC: 0.2 E.U./DL (ref 0.2–1)
WBC # BLD AUTO: 6.2 THOU/UL (ref 4–11)

## 2019-04-07 ENCOUNTER — HOSPITAL ENCOUNTER (EMERGENCY)
Dept: HOSPITAL 35 - ER | Age: 59
Discharge: HOME | End: 2019-04-07
Payer: COMMERCIAL

## 2019-04-07 VITALS — BODY MASS INDEX: 32.93 KG/M2 | WEIGHT: 230.01 LBS | HEIGHT: 70 IN

## 2019-04-07 VITALS — DIASTOLIC BLOOD PRESSURE: 62 MMHG | SYSTOLIC BLOOD PRESSURE: 102 MMHG

## 2019-04-07 DIAGNOSIS — I50.9: ICD-10-CM

## 2019-04-07 DIAGNOSIS — M62.838: Primary | ICD-10-CM

## 2019-04-07 DIAGNOSIS — G20: ICD-10-CM

## 2019-04-07 DIAGNOSIS — K21.9: ICD-10-CM

## 2019-04-07 DIAGNOSIS — G89.29: ICD-10-CM

## 2019-04-07 DIAGNOSIS — Z88.5: ICD-10-CM

## 2019-04-07 LAB
ANION GAP SERPL CALC-SCNC: 11 MMOL/L (ref 7–16)
BASOPHILS NFR BLD AUTO: 1.8 % (ref 0–2)
BUN SERPL-MCNC: 6 MG/DL (ref 7–18)
CALCIUM SERPL-MCNC: 9.4 MG/DL (ref 8.5–10.1)
CHLORIDE SERPL-SCNC: 98 MMOL/L (ref 98–107)
CO2 SERPL-SCNC: 30 MMOL/L (ref 21–32)
CREAT SERPL-MCNC: 0.8 MG/DL (ref 0.7–1.3)
EOSINOPHIL NFR BLD: 3 % (ref 0–3)
ERYTHROCYTE [DISTWIDTH] IN BLOOD BY AUTOMATED COUNT: 15.2 % (ref 10.5–14.5)
GLUCOSE SERPL-MCNC: 96 MG/DL (ref 74–106)
GRANULOCYTES NFR BLD MANUAL: 61.9 % (ref 36–66)
HCT VFR BLD CALC: 35.5 % (ref 42–52)
HGB BLD-MCNC: 11.7 GM/DL (ref 14–18)
LYMPHOCYTES NFR BLD AUTO: 25 % (ref 24–44)
MCH RBC QN AUTO: 28.3 PG (ref 26–34)
MCHC RBC AUTO-ENTMCNC: 33.1 G/DL (ref 28–37)
MCV RBC: 85.6 FL (ref 80–100)
MONOCYTES NFR BLD: 8.3 % (ref 1–8)
NEUTROPHILS # BLD: 4.8 THOU/UL (ref 1.4–8.2)
PLATELET # BLD: 548 THOU/UL (ref 150–400)
POTASSIUM SERPL-SCNC: 3.8 MMOL/L (ref 3.5–5.1)
RBC # BLD AUTO: 4.14 MIL/UL (ref 4.5–6)
SODIUM SERPL-SCNC: 139 MMOL/L (ref 136–145)
WBC # BLD AUTO: 7.7 THOU/UL (ref 4–11)

## 2019-04-17 ENCOUNTER — HOSPITAL ENCOUNTER (OUTPATIENT)
Dept: HOSPITAL 35 - PAIN | Age: 59
End: 2019-04-17
Attending: CLINICAL NURSE SPECIALIST
Payer: COMMERCIAL

## 2019-04-17 VITALS — DIASTOLIC BLOOD PRESSURE: 62 MMHG | SYSTOLIC BLOOD PRESSURE: 102 MMHG

## 2019-04-17 DIAGNOSIS — G89.29: Primary | ICD-10-CM

## 2019-04-17 DIAGNOSIS — Z79.899: ICD-10-CM

## 2019-04-17 DIAGNOSIS — G20: ICD-10-CM

## 2019-04-17 NOTE — NUR
Pain Clinic Assessment:
 
1. History of Osteoarthritis:
NO
   History of Rheumatoid Arthritis:
NO
 
2. Height:  ft.  in.  cm.
   Weight: 283.0 lb.  oz. 128.368 kg.
   Patient's BMI:
 
3. Vital Signs:
   BP: 102/62 Pulse: 96 Resp: 16
   Temp:  02 Sat: 100 ECG Mon:
 
4. Pain Intensity: 5
 
5. Fall Risk:
   Dizziness: N  Needs help standing or walking: Y
   Fallen in the last 3 months: N
   Fall risk comments:
 
 
6. Patient on Blood Thinner: None
 
7. History of Hypertension: Y
 
8. Opioid Therapy greater than 6 weeks: Y
   Opiate Contract Signed: 06/23/16
 
9. Risk Assessment Tool Provided: 0 LOW RISK
 
10. Functional Assessment Tool: 40/70
 
11. Recreational Drug Use: Never Drug Type:
    Tobacco Use: Never Smoker Tobacco Type:
       Amount or Packs/day:  How Many Years:
    Alcohol Use: No  Frequency:  Quant:

## 2019-04-22 ENCOUNTER — HOSPITAL ENCOUNTER (INPATIENT)
Dept: HOSPITAL 35 - ER | Age: 59
LOS: 4 days | Discharge: NURSING, REHAB OR LONG-TERM CARE | DRG: 689 | End: 2019-04-26
Attending: INTERNAL MEDICINE | Admitting: INTERNAL MEDICINE
Payer: COMMERCIAL

## 2019-04-22 VITALS — BODY MASS INDEX: 40.28 KG/M2 | HEIGHT: 60 IN | WEIGHT: 205.19 LBS

## 2019-04-22 VITALS — DIASTOLIC BLOOD PRESSURE: 82 MMHG | SYSTOLIC BLOOD PRESSURE: 122 MMHG

## 2019-04-22 VITALS — DIASTOLIC BLOOD PRESSURE: 81 MMHG | SYSTOLIC BLOOD PRESSURE: 118 MMHG

## 2019-04-22 VITALS — DIASTOLIC BLOOD PRESSURE: 73 MMHG | SYSTOLIC BLOOD PRESSURE: 131 MMHG

## 2019-04-22 VITALS — SYSTOLIC BLOOD PRESSURE: 116 MMHG | DIASTOLIC BLOOD PRESSURE: 77 MMHG

## 2019-04-22 DIAGNOSIS — Z95.820: ICD-10-CM

## 2019-04-22 DIAGNOSIS — G20: ICD-10-CM

## 2019-04-22 DIAGNOSIS — E43: ICD-10-CM

## 2019-04-22 DIAGNOSIS — K21.9: ICD-10-CM

## 2019-04-22 DIAGNOSIS — Z96.642: ICD-10-CM

## 2019-04-22 DIAGNOSIS — G89.29: ICD-10-CM

## 2019-04-22 DIAGNOSIS — I50.9: ICD-10-CM

## 2019-04-22 DIAGNOSIS — Z16.24: ICD-10-CM

## 2019-04-22 DIAGNOSIS — B96.5: ICD-10-CM

## 2019-04-22 DIAGNOSIS — I89.0: ICD-10-CM

## 2019-04-22 DIAGNOSIS — Z88.6: ICD-10-CM

## 2019-04-22 DIAGNOSIS — N39.0: Primary | ICD-10-CM

## 2019-04-22 DIAGNOSIS — I73.9: ICD-10-CM

## 2019-04-22 LAB
ALBUMIN SERPL-MCNC: 2.8 G/DL (ref 3.4–5)
ALT SERPL-CCNC: 9 U/L (ref 30–65)
ANION GAP SERPL CALC-SCNC: 10 MMOL/L (ref 7–16)
AST SERPL-CCNC: 13 U/L (ref 15–37)
BACTERIA-REFLEX: (no result) /HPF
BASOPHILS NFR BLD AUTO: 1.3 % (ref 0–2)
BILIRUB DIRECT SERPL-MCNC: 0.1 MG/DL
BILIRUB SERPL-MCNC: 0.4 MG/DL
BILIRUB UR-MCNC: NEGATIVE MG/DL
BUN SERPL-MCNC: 10 MG/DL (ref 7–18)
CALCIUM SERPL-MCNC: 9.5 MG/DL (ref 8.5–10.1)
CHLORIDE SERPL-SCNC: 102 MMOL/L (ref 98–107)
CO2 SERPL-SCNC: 29 MMOL/L (ref 21–32)
COLOR UR: YELLOW
CREAT SERPL-MCNC: 0.8 MG/DL (ref 0.7–1.3)
EOSINOPHIL NFR BLD: 2.6 % (ref 0–3)
ERYTHROCYTE [DISTWIDTH] IN BLOOD BY AUTOMATED COUNT: 15.5 % (ref 10.5–14.5)
GLUCOSE SERPL-MCNC: 105 MG/DL (ref 74–106)
GRANULOCYTES NFR BLD MANUAL: 65.3 % (ref 36–66)
HCT VFR BLD CALC: 33.6 % (ref 42–52)
HGB BLD-MCNC: 10.7 GM/DL (ref 14–18)
KETONES UR STRIP-MCNC: NEGATIVE MG/DL
LYMPHOCYTES NFR BLD AUTO: 20.5 % (ref 24–44)
MCH RBC QN AUTO: 27.2 PG (ref 26–34)
MCHC RBC AUTO-ENTMCNC: 31.7 G/DL (ref 28–37)
MCV RBC: 85.7 FL (ref 80–100)
MONOCYTES NFR BLD: 10.3 % (ref 1–8)
NEUTROPHILS # BLD: 3.5 THOU/UL (ref 1.4–8.2)
PLATELET # BLD: 348 THOU/UL (ref 150–400)
POTASSIUM SERPL-SCNC: 3.9 MMOL/L (ref 3.5–5.1)
PROT SERPL-MCNC: 8.3 G/DL (ref 6.4–8.2)
RBC # BLD AUTO: 3.92 MIL/UL (ref 4.5–6)
RBC # UR STRIP: (no result) /UL
RBC #/AREA URNS HPF: (no result) /HPF (ref 0–2)
SODIUM SERPL-SCNC: 141 MMOL/L (ref 136–145)
SP GR UR STRIP: 1.02 (ref 1–1.03)
SQUAMOUS: (no result) /LPF (ref 0–3)
URINE CLARITY: CLEAR
URINE GLUCOSE-RANDOM*: NEGATIVE
URINE LEUKOCYTES-REFLEX: (no result)
URINE NITRITE-REFLEX: NEGATIVE
URINE PROTEIN (DIPSTICK): (no result)
URINE WBC-REFLEX: (no result) /HPF (ref 0–5)
UROBILINOGEN UR STRIP-ACNC: 0.2 E.U./DL (ref 0.2–1)
WBC # BLD AUTO: 5.4 THOU/UL (ref 4–11)

## 2019-04-22 PROCEDURE — B54NZZA ULTRASONOGRAPHY OF LEFT UPPER EXTREMITY VEINS, GUIDANCE: ICD-10-PCS | Performed by: INTERNAL MEDICINE

## 2019-04-22 PROCEDURE — 27000 TENOTOMY ADDUCTOR HIP PERQ: CPT

## 2019-04-22 PROCEDURE — 10084: CPT

## 2019-04-22 PROCEDURE — 05HY33Z INSERTION OF INFUSION DEVICE INTO UPPER VEIN, PERCUTANEOUS APPROACH: ICD-10-PCS | Performed by: INTERNAL MEDICINE

## 2019-04-22 NOTE — NUR
VASCULAR ACCESS CONSULTED TO PLACE LINE FOR ABX. PT'S LABS,MEDS,HISTORY
REVIEWED. DISCUSSED BENEFITS AND RISK OF MIDLINE WITH PT AND
SISTERS,VERBALIZED UNDERSTANDING. PT WAS PREPPED AND DRAOPED FOR MAX BARRIER
PRECAUTIONS. DAVID BRACHIAL WAS WIDELY PATENT WITH USG,1% LIDOCAINE GIVEN SQ.
4FR POWER MIDLINE TRIMMED TO 10CM INSERTED TO 0CM WITH BRISK BR. ML SECURED
AND RELEASED FOR IMMEDIATE USE PER PROTOCOL TO RN

## 2019-04-23 VITALS — DIASTOLIC BLOOD PRESSURE: 68 MMHG | SYSTOLIC BLOOD PRESSURE: 111 MMHG

## 2019-04-23 VITALS — SYSTOLIC BLOOD PRESSURE: 137 MMHG | DIASTOLIC BLOOD PRESSURE: 76 MMHG

## 2019-04-23 VITALS — SYSTOLIC BLOOD PRESSURE: 108 MMHG | DIASTOLIC BLOOD PRESSURE: 67 MMHG

## 2019-04-23 VITALS — SYSTOLIC BLOOD PRESSURE: 143 MMHG | DIASTOLIC BLOOD PRESSURE: 94 MMHG

## 2019-04-23 VITALS — SYSTOLIC BLOOD PRESSURE: 120 MMHG | DIASTOLIC BLOOD PRESSURE: 79 MMHG

## 2019-04-23 VITALS — DIASTOLIC BLOOD PRESSURE: 87 MMHG | SYSTOLIC BLOOD PRESSURE: 122 MMHG

## 2019-04-23 NOTE — NUR
ASSESSMENT-PT WENT TO [facility] 3/19 BUT HAD DISCHARGED BACK HOME WITH FAMILY AND
[facility] FOLLOWING.  PT HAS BEEN USING A WALKER TO GET AROUND AND
MOVES VERY SLOWLY.  PT'S SISTER MELBA AND MOY ARE HIS CAREGIVERS.  PT HAS A
WC, HOSPITAL BED, GRAB BARS AND SHOWER CHAIR AT HOME ALREADY.  FOLLOWING TO
ASSIST WITH DC PLANNING.

## 2019-04-23 NOTE — NUR
Pt family wanted to meet with Doctors in am regarding dc plan.Rn encouraged pt
sister to call  in am to assist.Family in at present visiting.Will
continue to monitor.

## 2019-04-23 NOTE — NUR
Pt. IS ORIENTED 4X; ADMITTED TO THE UNIT FOR UTI-PSEUDOMONAS IN THE URINE.
STUDENT NURSE AND Pt. HAVE BUILT EXCELLENT RAPPORT THROUGHOUT THE DAY. Pt. HAS
BEEN COMPLIANT WITH REPOSITIONS IN THE BED TO RELIEVE PRESSURE ON COCCYX. DR. ERVIN VISITED WITH Pt. TODAY ABOUT OXYCODONE Rx; NEW ORDERS WERE REQUESTED
FOR A DOSAGE INCREASE FROM 5 MG TO 10 MG.

## 2019-04-24 VITALS — DIASTOLIC BLOOD PRESSURE: 79 MMHG | SYSTOLIC BLOOD PRESSURE: 134 MMHG

## 2019-04-24 VITALS — DIASTOLIC BLOOD PRESSURE: 73 MMHG | SYSTOLIC BLOOD PRESSURE: 106 MMHG

## 2019-04-24 VITALS — SYSTOLIC BLOOD PRESSURE: 107 MMHG | DIASTOLIC BLOOD PRESSURE: 71 MMHG

## 2019-04-24 NOTE — NUR
ASSUMED CARE OF PT AT 0700. ASSESSMENT COMPLETED. ROOM AIR. DENIES SOA OR
CHEST PAIN. CARPIO CATHETER IN PLACE. C/O GENERALIZED PAIN, PAIN MEDS GIVEN AS
ORDERED. A&O,X4. CONTRACTURES NOTED. SOFT TOUCH CALL LIGHT WITHIN REACH. MAKES
NEEDS KNOWN. PT IN STABLE CONDITION.

## 2019-04-24 NOTE — NUR
PT'S SISTER MELBA CALLED WANTING TO TRY TO BE HERE TOMORROW WHEN DRS ROUND TO
SEE WHAT THEIR PLAN OF ACTION FOR TREATING PT'S INFECTION WILL BE.  ALL 3 OF
PT'S SISTERS WORK BUT ONE WOULD LIKE TO BE HERE WHEN DRS ARE ROUNDING.  PT
GETS OVERWHELMED WHEN PRESENTED WITH A LOT OF INFORMATION.  FOLLOWING.

## 2019-04-24 NOTE — NUR
PATIENT ALERT AND ORIENTED X4.  C/O PAIN X1.  MED GIVEN.  D/Y HANDS HURTING
TOLD PATIENT TO CALL OUT WHEN HE NEEDED A DRINK.  HAS SOFT TOUCH CALL LIGHT
BECAUSE OF HANDS.  SLEPT LITTLE THE FIST OF THE NIGHT BUT AS NIGHT WORE ON
SLEPT MORE.

## 2019-04-24 NOTE — NUR
Assess due to high BMI 40.1=extreme class III obesity.  Pt cared for by
sisters at home.  Hx bilateral chronic lymphedema.  Wts are highly variable
~205-225 in past records.  Tolerates regular diet and usually will eat >75%
meals.  Low nutrition risk

## 2019-04-25 VITALS — SYSTOLIC BLOOD PRESSURE: 119 MMHG | DIASTOLIC BLOOD PRESSURE: 77 MMHG

## 2019-04-25 VITALS — DIASTOLIC BLOOD PRESSURE: 72 MMHG | SYSTOLIC BLOOD PRESSURE: 108 MMHG

## 2019-04-25 VITALS — SYSTOLIC BLOOD PRESSURE: 118 MMHG | DIASTOLIC BLOOD PRESSURE: 58 MMHG

## 2019-04-25 VITALS — DIASTOLIC BLOOD PRESSURE: 65 MMHG | SYSTOLIC BLOOD PRESSURE: 100 MMHG

## 2019-04-25 NOTE — NUR
FAXED REFERRAL TO [facility] SPOKE WITH [facility] IN ADM. THEY CANNOT ACCEPT PT.
DUE TO AGE LIMIT NEED TO BE >62 YR.OLD.
FAXED REFERRAL TO [facility] SPOKE WITH [facility] IN ADM, SHE RECEIVED REFERRAL BUT
CANNOT ACCEPT ANY NEW PT'S RIGHT NOW DUE TO PT'S IN FACILITY HAS INFLUENZA NO
ADM. TIL NEXT WEEK. DCP TO FOLLOW.

## 2019-04-25 NOTE — NUR
Assumed care of pt at 1900. Pt alert and oriented x4. Penaloza catheter in place.
Refuses q2h turns for most of shift. Education provided. Pt states he wants to
be on his back to sleep overnight. Prn pain meds administered. IV antibiotics
administered. Soft-touch call light in place. Pt able to make needs heard.
Fall precautions in place. Will continue to monitor.

## 2019-04-25 NOTE — NUR
FAXED REFERRAL TO [facility] [place] LEFT MSG WITH IMELDA IN ADM. TO REVIEW AND
THAT WE ANTICIPATE DC TOMORROW.
FAXED REFERRAL TO [facility] SPOKE WITH LEFT MSG WITH ADM. TO REVIEW.

## 2019-04-25 NOTE — NUR
ASSUMED CARE AT 0700, SHIFT ASSESSMENT DONE, MEDS GIVEN, VSS. REPORTED PAIN,
PRN PAIN MEDS GIVEN. Q2 TURN, REPOSITIONED, FALL PRECAUTIONS IN PLACE. WILL
CONTINUE TO ASSESS AND ASSIST WITH ADLs AS NEEDED.

## 2019-04-25 NOTE — NUR
FAXED REFERRAL TO [facility] SPOKE WITH CHULA IN ADM. SHE RECEIVED
REFERRAL AND WILL REVIEW.
FAXED REFERRAL TO [facility] SPOKE WITH GAMA SHE WILL LET ADM. KNOW ABOUT
REFERRAL.
[facility] RECEIV[facility]FERRAL AND
CANNOT ACCEPT ON IV ABX. DCP TO FOLLOW.

## 2019-04-26 VITALS — SYSTOLIC BLOOD PRESSURE: 116 MMHG | DIASTOLIC BLOOD PRESSURE: 79 MMHG

## 2019-04-26 VITALS — SYSTOLIC BLOOD PRESSURE: 118 MMHG | DIASTOLIC BLOOD PRESSURE: 69 MMHG

## 2019-04-26 NOTE — NUR
ASSUMED CARE OF PT AT 0700. ASSESSMENT COMPLETED. A&O,X4. PARAPLEGIC. SOFT
TOUCH CALL LIGHT WITHIN REACH. MAKES NEEDS KNOWN. ROOM AIR. DENIES SOA. CARPIO
CATHETER IN PLACE. BM TODAY. C/O CHRONIC PAIN, PAIN MEDS GIVEN AS ORDERED. NEW
DISCHARGE ORDERS. REPORT CALLED TO [facility]. PT LEFT VIA MEDICAL TRANSPORT
VIA CART AT [phone].

## 2019-04-26 NOTE — NUR
Following for d/c planning needs.  Spoke with admissions coordinator at
[facility].  They are able to accept due to cost of IVAB.  Called [facility] and left message.  Have not received return call.  Called
[facility] and spoke with admissions coordinator.  They do not have a bed
available today, but may have one on Monday.  Called [facility].  Received telephone call from [facility] and they are able to
accept today.  Arranged stretcher transport through [facility] for
pick-up between [phone].  Chart copied.  Faxed orders to facility.  Pt is
agreeable to plans.  Patient choice letter signed and placed on chart.  No
other needs identified.

## 2019-04-26 NOTE — NUR
ASSUMED PT CARE 1900. PT ALERT AND ORIENTED. REASSESSMENT COMPLETE. VSS.
MIDLINE DRESSING C/D/I, NO SIGNS OF INFILTRATION. DENIES N/V. PT REPORTS PAIN,
SEE EMAR. SOFT TOUCH CALL LIGHT WITHIN REACH, WILL CONTINUE POC UNTIL EOS.

## 2019-06-12 ENCOUNTER — HOSPITAL ENCOUNTER (OUTPATIENT)
Dept: HOSPITAL 35 - PAIN | Age: 59
End: 2019-06-12
Attending: CLINICAL NURSE SPECIALIST
Payer: COMMERCIAL

## 2019-06-12 VITALS — DIASTOLIC BLOOD PRESSURE: 98 MMHG | SYSTOLIC BLOOD PRESSURE: 140 MMHG

## 2019-06-12 DIAGNOSIS — I89.0: ICD-10-CM

## 2019-06-12 DIAGNOSIS — N39.0: ICD-10-CM

## 2019-06-12 DIAGNOSIS — Z79.899: ICD-10-CM

## 2019-06-12 DIAGNOSIS — G21.9: Primary | ICD-10-CM

## 2019-06-12 NOTE — NUR
Pain Clinic Assessment:
 
1. History of Osteoarthritis:
NO
   History of Rheumatoid Arthritis:
NO
 
2. Height:  ft.  in.  cm.
   Weight:  lb.  oz.  kg.
   Patient's BMI:
 
3. Vital Signs:
   BP: 140/98 Pulse: 88 Resp: 16
   Temp:  02 Sat: 100 ECG Mon:
 
4. Pain Intensity: 6
 
5. Fall Risk:
   Dizziness: N  Needs help standing or walking: Y
   Fallen in the last 3 months: N
   Fall risk comments:
 
 
6. Patient on Blood Thinner: None
 
7. History of Hypertension: Y
 
8. Opioid Therapy greater than 6 weeks: Y
   Opiate Contract Signed: 06/23/16
 
9. Risk Assessment Tool Provided: 0 LOW RISK
 
10. Functional Assessment Tool: 40/70
 
11. Recreational Drug Use: Unknown Drug Type:
    Tobacco Use: Never Smoker Tobacco Type:
       Amount or Packs/day:  How Many Years:
    Alcohol Use: No  Frequency:  Quant:

## 2019-10-30 ENCOUNTER — HOSPITAL ENCOUNTER (OUTPATIENT)
Dept: HOSPITAL 35 - PAIN | Age: 59
End: 2019-10-30
Attending: CLINICAL NURSE SPECIALIST
Payer: COMMERCIAL

## 2019-10-30 VITALS — SYSTOLIC BLOOD PRESSURE: 144 MMHG | DIASTOLIC BLOOD PRESSURE: 97 MMHG

## 2019-10-30 DIAGNOSIS — I89.0: ICD-10-CM

## 2019-10-30 DIAGNOSIS — G89.29: Primary | ICD-10-CM

## 2019-10-30 DIAGNOSIS — G20: ICD-10-CM

## 2019-10-30 DIAGNOSIS — M79.652: ICD-10-CM

## 2019-10-30 DIAGNOSIS — Z96.0: ICD-10-CM

## 2019-10-30 DIAGNOSIS — Z79.899: ICD-10-CM

## 2019-10-30 DIAGNOSIS — Z79.891: ICD-10-CM

## 2019-10-30 DIAGNOSIS — N39.0: ICD-10-CM

## 2019-10-30 DIAGNOSIS — Z88.5: ICD-10-CM

## 2019-10-30 NOTE — NUR
Pain Clinic Assessment:
 
1. History of Osteoarthritis:
NO
   History of Rheumatoid Arthritis:
NO
 
2. Height:  ft.  in.  cm.
   Weight:  lb.  oz.  kg.
   Patient's BMI:
 
3. Vital Signs:
   BP: 144/97 Pulse: 96 Resp: 16
   Temp:  02 Sat: 97 ECG Mon:
 
4. Pain Intensity: 6
 
5. Fall Risk:
   Dizziness: N  Needs help standing or walking: Y
   Fallen in the last 3 months: N
   Fall risk comments:
 
 
6. Patient on Blood Thinner: None
 
7. History of Hypertension: Y
 
8. Opioid Therapy greater than 6 weeks: Y
   Opiate Contract Signed: 06/23/16
 
9. Risk Assessment Tool Provided: 0 LOW RISK
 
10. Functional Assessment Tool: 40/70
 
11. Recreational Drug Use: Unknown Drug Type:
    Tobacco Use: Never Smoker Tobacco Type:
       Amount or Packs/day:  How Many Years:
    Alcohol Use: No  Frequency:  Quant:

## 2019-10-31 NOTE — HPC
[facility]
[address]
[place]   [address]                     PAIN MANAGEMENT CONSULTATION  
_______________________________________________________________________________
 
Name:       VIOLA PARR ECTOR                Room #:                     REG [facility].#:      [id number]                       Account #:      [id number]  
Admission:  10/30/19    Attend Phys:    Codie Noriega     
Discharge:              Date of Birth:  [birth year]  
                                                          Report #: [id number]
                                                                    [id number]   
_______________________________________________________________________________
THIS REPORT FOR:   //name//                          
 
CC: Codie Chaudhari MD
 
DATE OF SERVICE:  10/30/2019
 
 
CHIEF COMPLAINT:  Chronic pain associated with Parkinson's disease.
 
HISTORY OF PRESENT ILLNESS:  This is a very pleasant 59-year-old gentleman who
returns to the pain clinic today for a refill of his medications.  He reports he
was able to go 4 months since his last visit here.  The part of that time, he
was hospitalized in [facility] for a urinary tract infection that
did affect his blood.  He was on IV antibiotics for at least 3 weeks.  While in
the hospital, he reports he did not go to a rehabilitation center.  He was able
to go back home after his hospitalization.  He is here today, rating a pain
score of 6/10 complaining of most of his pain is in his left thigh today, but he
does also have bilateral leg pain.  It is an achy, sore sharp pain of rating
6/10, but he does feel that his medications are beneficial.
 
ALLERGIES:  MORPHINE.
 
CURRENT LIST OF MEDICATIONS:  OxyIR 10 mg b.i.d. p.r.n., zinc, tizanidine,
oxycodone 5 mg p.r.n., ascorbic acid, Protonix, Flomax, Requip, vitamin B12,
vitamin D, Lasix 40 mg b.i.d., potassium 20 mEq daily, carbidopa/levodopa 25/100
t.i.d.
 
PQRS:
1.  He denies any rheumatoid arthritis.  He has arthritic changes in his
bilateral hips.
2.  Height is 5 feet 10 inches, not weighed today.
3.  Vital signs, 144/97, pulse is 96, respirations 16, oxygen sat is 97.
4.  Pain score is 6/10.
5.  Denies dizziness.  He is in a wheelchair.  Does need significant help with
movement.  He has not fallen in the last 3 months.
6.  The patient is not on any blood thinners, but does take medicine for
hypertension.  His opioid therapy is greater than 6 weeks; therefore, an opioid
signed contract is on the chart.  Risk assessment tool is low.  Functional
assessment is 40/70.
7.  Recreational drug use, he denies.  He is not a smoker and does not drink
alcohol.
 
According to the prescription monitoring system,  he is filling appropriately
 
 
 
[address]   [address]                     PAIN MANAGEMENT CONSULTATION  
_______________________________________________________________________________
 
Name:       VIOLA PARR                Room #:                     REG [facility] MAN#:      [id number]                       Account #:      [id number]  
Admission:  10/30/19    Attend Phys:    Codie Noriega     
Discharge:              Date of Birth:  [birth year]  
                                                          Report #: [id number]
                                                                    [id number]   
_______________________________________________________________________________
for his medications, though he was in the hospital for some time and did receive
the medications at that time, but no prescriptions.
 
PHYSICAL EXAMINATION:
GENERAL:  This is a well-developed, well-nourished male who appears his stated
age.  He is alert and orientated.  He is in a wheelchair with family present
today, rating his pain score at 6/10.
HEENT:  Normocephalic, atraumatic.  Extraocular eye muscles are intact.
NECK:  Without adenopathy.  He does have decreased range of motion related to
his Parkinson's in his neck and his arms.
MUSCULOSKELETAL:  He has limited movement of his right arm with decreased muscle
bulk and tone.  He has contractures in his right hand.  His lower extremity
edema is 1+.  He has a Penaloza catheter present.  He is in a wheelchair as well.
 
We reviewed the fact that opiate medications are being used to provide analgesia
adequate to support activities of daily living, not attempting to achieve a
specific pain score on the 0-10 Visual Analog Scale.  The current opiate
medications are providing sufficient analgesia to allow the patient to
participate in activities of daily living.  The patient is not exhibiting any
aberrant behavior suggestive of drug diversion.  The patient is not having any
adverse reactions to medications.  The patient is not suffering from daytime
somnolence or mental acuity changes.  The patient is managing opiate-induced
constipation with appropriate over-the-counter agents and dietary
considerations.  The patient was counseled on concern for caution with operating
a motor vehicle while using opiate medications.
 
A physical exam was performed and the patient's functional status was evaluated.
 All patients with back pain were advised against the bed rest greater than 4
days and were advised to return to normal activities.  Pain score assessment was
noted and the treatment plan was reviewed with the patient.  All current
medications, both prescribed and OTC were reviewed and reconciled on the
electronic medical record.  Tobacco screening was accomplished and smoking
cessation was advised when indicated.  BMI was noted and diet/exercise
modification was recommended for all patients following outside normal
parameters.
 
I reviewed with the patient today their responsibilities to safeguard
prescription medications, reviewed their responsibility to utilize medications
only as prescribed by the physician.  They are to seek and receive pain
medications only from 1 physician group ([facility]).  They are to use 1
pharmacy and keep the clinic informed if they change pharmacies.  Their
responsibilities include making followup visits in a timely fashion and to avoid
abrupt discontinuation of medication usage.  Their responsibilities further
include bringing their medications (bottles from the pharmacy with residual
pills) to the visit for possible confirmation of pill counts and the patient
understands it is their responsibility to submit to random drug screens to
 
 
 
[facility]
[address]
[place]   [address]                     PAIN MANAGEMENT CONSULTATION  
_______________________________________________________________________________
 
Name:       VIOLA PARR ECTOR                Room #:                     REG [facility].#:      [id number]                       Account #:      [id number]  
Admission:  10/30/19    Attend Phys:    Codie Noriega     
Discharge:              Date of Birth:  [birth year]  
                                                          Report #: [id number]
                                                                    [id number]   
_______________________________________________________________________________
ensure both that the medications prescribed are present, and that no other
controlled substances are present.  All prescriptions provided today were
generated electronically.
 
ASSESSMENT:
1.  Parkinson disease.
2.  Chronic lymphedema in his lower extremities.
3.  Left thigh pain.
4.  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
5.  Urinary tract infections with indwelling Penaloza catheter.
6.  History of Clostridium difficile.
7.  Management of high-risk medications under terms of written opioid agreement.
 
PLAN:  We discussed treatment options with the patient today.  I explained to
him that Dr. Archer and I have discussed possible long-term medication opioids
for him.  The patient is worried that we will give him morphine, which he is
allergic to.  I explained to him that is not the medication that we were
considering.  We are thinking of Xtampza at a low dose.  That way, he would have
hopefully better pain control throughout the day.  The patient is very worried
about changing his medications.  I reiterated that we are considering that we
will not change his medications today.  The patient is thankful for that. 
Scripts given today for his oxycodone 5 mg every 8 hours, #50, for today and
4-week release and oxy 10 mg, #60, for today and 4-week release. This is 52 mme
according to the CDC guidelines. He denies constipation or daytime sommelance.
 
The patient is seen in collaboration with Dr. Chance Archer who did see
the patient as well today.  The patient will return in 2 months as followup
visit.
 
 
 
 
 
 
 
 
 
 
 
 
 
 
 
 
  <ELECTRONICALLY SIGNED>
   By: Codie Noriega             
  10/31/19     0858
D: 10/30/19 1436                           _____________________________________
T: 10/31/19 0233                           Codie Noriega               /nt

## 2020-02-19 ENCOUNTER — HOSPITAL ENCOUNTER (OUTPATIENT)
Dept: HOSPITAL 35 - PAIN | Age: 60
End: 2020-02-19
Payer: COMMERCIAL

## 2020-02-19 VITALS — DIASTOLIC BLOOD PRESSURE: 90 MMHG | SYSTOLIC BLOOD PRESSURE: 124 MMHG

## 2020-02-19 DIAGNOSIS — Z79.899: ICD-10-CM

## 2020-02-19 DIAGNOSIS — I89.0: ICD-10-CM

## 2020-02-19 DIAGNOSIS — Z96.642: ICD-10-CM

## 2020-02-19 DIAGNOSIS — G20: Primary | ICD-10-CM

## 2020-02-19 DIAGNOSIS — R26.9: ICD-10-CM

## 2020-02-19 DIAGNOSIS — G25.9: ICD-10-CM

## 2020-02-19 NOTE — HPC
[facility]
[address]
[place]   [address]                     PAIN MANAGEMENT CONSULTATION  
_______________________________________________________________________________
 
Name:       VIOLA PARR                Room #:                     REG [facility]ACELI MELENDEZ#:      [id number]                       Account #:      [id number]  
Admission:  02/19/20    Attend Phys:    JI Archer MD    
Discharge:              Date of Birth:  [birth year]  
                                                          Report #: [id number]
                                                                    [id number]   
_______________________________________________________________________________
THIS REPORT FOR:  
 
cc:  Pennie Chaudhari MD,JI Kim MD, MD                                            ~
CC: JI Chaudhari
 
DATE OF SERVICE:  02/19/2020
 
 
CHIEF COMPLAINT:  Still having pain associated with Parkinson's.
 
HISTORY:  The patient is a 59-year-old gentleman who has been followed in the
pain clinic because of chronic pain.  As you may recall, he suffers from
Parkinson's disease.  He continues to have pain in his right hand and right leg.
 He has had problems with pain since 01/2016.  Has pain involving his right hand
as well as his right leg.  He did fall.  He was hospitalized for a period of
time at a recovery facility.  Notes his pain is a 6/10.  Prolonged sitting and
activity can exacerbate his discomfort.  He does continue to have some swelling
in his lower extremities.  He has had stents in his legs on both sides.  Notes
some numbness, tingling, and tremor activity.  He has been followed in the wound
clinic in the past because of skin breakdown.
 
ALLERGIES:  MORPHINE.
 
CURRENT MEDICATIONS:  OxyIR 10 mg 1 p.o. b.i.d., zinc, tizanidine, oxycodone 5
mg p.r.n., ascorbic acid, Protonix, Fosamax, Requip, vitamin B12, vitamin D,
Lasix 40 mg b.i.d., potassium 20 mEq, carbidopa/levodopa 25/100 t.i.d., Requip
0.25 mg t.i.d., Vancomycin 125 mg oral q.6 hours, Roxicodone 5 mg q.8 hours at
this juncture.
 
PAIN CLINIC ASSESSMENT AND PQRS:
1.  The patient denies being treated for rheumatoid arthritis.  Has some
arthritic changes in his hips bilaterally.
2.  Height 5 feet 10 inches, no weight today.
3.  Vital signs:  Blood pressure 124/90, pulse 93, respiratory rate 14, room air
saturation 96%.
4.  Pain intensity, 6/10.
5.  Fall history.  The patient has fallen in the last 3 months.
6.  Blood thinner.  The patient is not on a blood thinning medication.
7.  Hypertension.  The patient is being treated for hypertension.
8.  Opioids greater than 6 weeks.  The patient received medication from one
source, the pain clinic.
9.  Risk assessment tool, low for opioid use.
10.  Functional assessment tool, 40/70.
 
 
 
[address]   [address]                     PAIN MANAGEMENT CONSULTATION  
_______________________________________________________________________________
 
Name:       VIOLA PARR                [facility]      [id number]                       Account #:      [id number]  
Admission:  02/19/20    Attend Phys:    JI Archer MD    
Discharge:              Date of Birth:  [birth year]  
                                                          Report #: [id number]
                                                                    [id number]   
_______________________________________________________________________________
11.  Recreational drug use.  The patient denies.
12.  Tobacco.  The patient has never smoked.
13.  Alcohol.  The patient denies use of alcoholic beverages.
 
PHYSICAL EXAMINATION:
GENERAL:  The patient is a well-developed, well-nourished, black male, appears
his stated age.  He is alert and oriented x 3.  He does have slow movement.  He
is exhibiting signs of Parkinsonism.  Has somewhat blunted facial expressions. 
Speech is somewhat slowed.  He is accompanied by his family member.
HEENT:  Normocephalic, atraumatic.  Extraocular eye muscles intact.  Sclerae
nonicteric.  Mucous membranes moist.
MUSCULOSKELETAL:  Limited right arm with decreased muscle bulk and tone. 
Beginning to show some contractures in his right hand.  Lower extremity edema. 
Has a Penaloza catheter in place.  He is in a wheelchair.
 
IMPRESSION:
1.  Parkinson's disease involving his hands.
2.  Chronic lymphedema in the lower extremities.
3.  History of venous insufficiency.
4.  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
5.  Left hip replacement, status post osteoarthritic changes.
6.  Gait and movement problems secondary to Parkinson's disease.  The patient is
in a wheelchair.
 
RECOMMENDATIONS:  We discussed treatment options with the patient.  At this
juncture, we will continue with his current medical regimen of OxyIR 5 mg 1 p.o.
t.i.d.  He will also continue with the OxyIR 10 mg b.i.d.  The patient will
continue with the tizanidine to help with muscle relaxation.  He will call us if
he has any problems with his medications.  A script for his medications has been
written.
 
We would like to thank you for letting us participate in his care.  He is aware
that opioid medications can be less effective as time goes on.  Overall, he
feels medications are helpful.  They able him to have a more comfortable life. 
Continues to try to stay as active as possible to continue with movement of his
upper as well as lower extremities.
 
We would like to thank you for letting us participate in his care.  We hope he
continues to improve.
 
 
 
 
 
                         
   By:                               
                   
D: 03/09/20 2007                           _____________________________________
T: 03/10/20 0323                           JI Archer MD              /PMT

## 2020-02-19 NOTE — NUR
Pain Clinic Assessment:
 
1. History of Osteoarthritis:
NO
   History of Rheumatoid Arthritis:
NO
 
2. Height:  ft.  in.  cm.
   Weight:  lb.  oz.  kg.
   Patient's BMI:
 
3. Vital Signs:
   BP: 124/90 Pulse: 93 Resp: 14
   Temp:  02 Sat: 96 ECG Mon:
 
4. Pain Intensity: 6
 
5. Fall Risk:
   Dizziness: N  Needs help standing or walking: Y
   Fallen in the last 3 months: N
   Fall risk comments:
 
 
6. Patient on Blood Thinner: None
 
7. History of Hypertension: Y
 
8. Opioid Therapy greater than 6 weeks: Y
   Opiate Contract Signed: 06/23/16
 
9. Risk Assessment Tool Provided: 0 LOW RISK
 
10. Functional Assessment Tool: 40/70
 
11. Recreational Drug Use: Past greater than 3 mos Drug Type:
    Tobacco Use: Never Smoker Tobacco Type:
       Amount or Packs/day:  How Many Years:
    Alcohol Use: No  Frequency:  Quant:

## 2020-04-15 ENCOUNTER — HOSPITAL ENCOUNTER (OUTPATIENT)
Dept: HOSPITAL 35 - TELEPC | Age: 60
End: 2020-04-15
Payer: COMMERCIAL

## 2020-04-15 DIAGNOSIS — Z79.899: ICD-10-CM

## 2020-04-15 DIAGNOSIS — G20: Primary | ICD-10-CM

## 2020-04-15 DIAGNOSIS — R59.0: ICD-10-CM

## 2020-04-15 DIAGNOSIS — M79.605: ICD-10-CM

## 2020-04-15 DIAGNOSIS — Z87.440: ICD-10-CM

## 2020-08-12 ENCOUNTER — HOSPITAL ENCOUNTER (OUTPATIENT)
Dept: HOSPITAL 35 - PAIN | Age: 60
End: 2020-08-12
Payer: COMMERCIAL

## 2020-08-12 DIAGNOSIS — G89.29: ICD-10-CM

## 2020-08-12 DIAGNOSIS — G20: Primary | ICD-10-CM

## 2020-08-12 DIAGNOSIS — R60.0: ICD-10-CM

## 2020-08-12 DIAGNOSIS — M79.652: ICD-10-CM

## 2020-08-12 DIAGNOSIS — Z96.0: ICD-10-CM

## 2020-08-12 DIAGNOSIS — F11.90: ICD-10-CM

## 2020-08-12 DIAGNOSIS — N39.0: ICD-10-CM

## 2020-08-31 NOTE — HPC
[facility]
[place]   [address]                     PAIN MANAGEMENT CONSULTATION  
_______________________________________________________________________________
 
Name:       VIOLA PARR                Room #:                     REG ARACELI MELENDEZ.#:      [id number]                       Account #:      [id number]  
Admission:  08/12/20    Attend Phys:    JI Archer MD    
Discharge:              Date of Birth:  [birth year]  
                                                          Report #: [id number]
                                                                    [id number]   
_______________________________________________________________________________
THIS REPORT FOR:  
 
cc:  Pennie Chaudhari MD,JI Kim MD, MD                                            ~
CC: JI Chaudhari
 
DATE OF SERVICE:  08/12/2020
 
 
THE PATIENT UNDERWENT EVALUATION USING TELE MED.  THE PATIENT AGRESS TO PROCEED
WITH THE TELE MED VISIT.
 
CHIEF COMPLAINT:  Medication is still helpful.  The patient is unable to come to
the Pain Clinic because of the COVID-19 pandemic.
 
HISTORY:  The patient is a 59-year-old gentleman who has been followed in the
Pain Clinic.  Continues to have pain and discomfort.  He has an edema in his
lower extremity.  Still has pain in his legs.  They continue to wrap his legs to
help decrease some of the swelling.  He feels that his medications are helpful. 
They are not as helpful and as effective as he would like, but he does feel that
they are providing needed benefit.  Still suffers from Parkinson's disease.  He
would like to have his medications renewed.  His sisters continue to be quite
helpful in his care on a daily basis.  He is sheltering at home because of the
COVID-19 pandemic.
 
ALLERGIES:  No known drug allergies.
 
CURRENT MEDICATIONS:  Tizanidine, zinc, OxyIR 10 mg 1 p.o. b.i.d., oxycodone 5
mg, ascorbic acid, Protonix, Flomax, Requip, vitamin B12, vitamin D, Lasix 40 mg
b.i.d., potassium 20 mEq, carbidopa/levodopa 25 mg 1 p.o. t.i.d.
 
PAIN CLINIC ASSESSMENT/PQRS:
1.  The patient has pain and discomfort in lower extremities.  Has some
arthritic changes in his hips bilaterally.  The patient is not being treated for
rheumatoid arthritis.
2.  Last height 5 feet, 10 inches.
3.  Pain score, 5/10 today.
4.  Blood thinner.  The patient is not on a blood thinning medication.
5.  Hypertension.  The patient is not being treated for hypertension.
6.  Opioids.  The patient receives medications from the Pain Clinic.
7.  Risk assessment tool, low for opioid use.
8.  Functional assessment tool, 40/70.
9.  Recreational drug use.  The patient denies.
 
 
 
[place] [address]                     PAIN MANAGEMENT CONSULTATION  
_______________________________________________________________________________
 
Name:       VIOLA PARR ECTOR                Room #:                     REG [facility]#:      [id number]                       Account #:      [id number]  
Admission:  08/12/20    Attend Phys:    JI Archer MD    
Discharge:              Date of Birth:  [birth year]  
                                                          Report #: [id number]
                                                                    [id number]   
_______________________________________________________________________________
10.  Tobacco:  The patient denies.
11.  Alcohol.  The patient denies use of alcoholic beverages.
 
REVIEW OF SYSTEMS:
GENERAL:  The patient's voice is clear.  He is speaking with his usual slow
____.
CONSTITUTIONAL:  The patient states that there have been no significant changes.
HEENT:  No changes.
CARDIOVASCULAR:  Denies changes.
RESPIRATORY STATUS:  Denies any new problems.
GASTROINTESTINAL:  Denies changes.
GENITOURINARY:  Generally has a suprapubic catheter in place.
MUSCULOSKELETAL:  Decreased muscle strength and rigidity associated with
Parkinson's disease as well as increased edema in the lower extremities with
wrapping of the legs to decrease and limit swelling.
PSYCHIATRIC:  No new memory changes or complaints of insomnia.
ENDOCRINE:  No new changes.
ALLERGIC:  No new changes.
 
IMPRESSION:
1.  History of Parkinson's disease.
2.  Chronic lymphedema of the lower extremity.
3.  Left thigh pain.
4.  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
5.  Urinary tract infections with an indwelling suprapubic catheter.
6.  History of Clostridium difficile.
7.  High risk use of opioids secondary to chronic pain.
 
RECOMMENDATIONS:  We discussed treatment options with the patient.  At this
juncture, we will continue with his opioid medications.  He states that the
medications are helpful.  He continues to be helped by his sisters.  He
continues to note difficulty and stiffness associated with his Parkinson's
disease.  He feels that his medications continue to be helpful.  A script for
tizanidine 2 mg p.o. b.i.d., oxycodone 5 mg 1 p.o. q. 8 hours and OxyIR 10 mg
b.i.d. have been written and sent to the patient.
 
We would like to thank you for letting us participate in his care.  We hope he
continues to improve.
 
 
 
 
 
 
  <ELECTRONICALLY SIGNED>
   By: JI Archer MD            
  08/31/20     2236
D: 08/27/20 2341                           _____________________________________
T: 08/28/20 0118                           JI Archer MD              /nt

## 2020-10-05 ENCOUNTER — HOSPITAL ENCOUNTER (INPATIENT)
Dept: HOSPITAL 35 - ER | Age: 60
LOS: 11 days | Discharge: NURSING, REHAB OR LONG-TERM CARE | DRG: 602 | End: 2020-10-16
Attending: INTERNAL MEDICINE | Admitting: INTERNAL MEDICINE
Payer: COMMERCIAL

## 2020-10-05 ENCOUNTER — HOSPITAL ENCOUNTER (OUTPATIENT)
Dept: HOSPITAL 35 - HYPER | Age: 60
End: 2020-10-05
Attending: PREVENTIVE MEDICINE
Payer: COMMERCIAL

## 2020-10-05 VITALS — DIASTOLIC BLOOD PRESSURE: 71 MMHG | SYSTOLIC BLOOD PRESSURE: 115 MMHG

## 2020-10-05 VITALS — DIASTOLIC BLOOD PRESSURE: 62 MMHG | SYSTOLIC BLOOD PRESSURE: 106 MMHG

## 2020-10-05 VITALS — DIASTOLIC BLOOD PRESSURE: 98 MMHG | SYSTOLIC BLOOD PRESSURE: 146 MMHG

## 2020-10-05 VITALS — SYSTOLIC BLOOD PRESSURE: 112 MMHG | DIASTOLIC BLOOD PRESSURE: 88 MMHG

## 2020-10-05 VITALS — BODY MASS INDEX: 35.79 KG/M2 | HEIGHT: 70 IN | WEIGHT: 250 LBS

## 2020-10-05 DIAGNOSIS — L89.150: ICD-10-CM

## 2020-10-05 DIAGNOSIS — G20: ICD-10-CM

## 2020-10-05 DIAGNOSIS — R60.0: ICD-10-CM

## 2020-10-05 DIAGNOSIS — L03.116: Primary | ICD-10-CM

## 2020-10-05 DIAGNOSIS — I87.2: ICD-10-CM

## 2020-10-05 DIAGNOSIS — G89.29: ICD-10-CM

## 2020-10-05 DIAGNOSIS — I89.0: Primary | ICD-10-CM

## 2020-10-05 DIAGNOSIS — E43: ICD-10-CM

## 2020-10-05 DIAGNOSIS — B96.89: ICD-10-CM

## 2020-10-05 DIAGNOSIS — Z87.440: ICD-10-CM

## 2020-10-05 DIAGNOSIS — Z88.5: ICD-10-CM

## 2020-10-05 DIAGNOSIS — E66.9: ICD-10-CM

## 2020-10-05 DIAGNOSIS — E87.6: ICD-10-CM

## 2020-10-05 DIAGNOSIS — M24.541: ICD-10-CM

## 2020-10-05 DIAGNOSIS — I73.9: ICD-10-CM

## 2020-10-05 DIAGNOSIS — Z86.14: ICD-10-CM

## 2020-10-05 DIAGNOSIS — Z99.3: ICD-10-CM

## 2020-10-05 DIAGNOSIS — Z96.649: ICD-10-CM

## 2020-10-05 DIAGNOSIS — Z96.642: ICD-10-CM

## 2020-10-05 DIAGNOSIS — I89.0: ICD-10-CM

## 2020-10-05 DIAGNOSIS — M24.542: ICD-10-CM

## 2020-10-05 DIAGNOSIS — Z20.828: ICD-10-CM

## 2020-10-05 DIAGNOSIS — L03.115: ICD-10-CM

## 2020-10-05 DIAGNOSIS — Z95.820: ICD-10-CM

## 2020-10-05 DIAGNOSIS — I50.9: ICD-10-CM

## 2020-10-05 LAB
ALBUMIN SERPL-MCNC: 2.7 G/DL (ref 3.4–5)
ALT SERPL-CCNC: 10 U/L (ref 30–65)
ANION GAP SERPL CALC-SCNC: 8 MMOL/L (ref 7–16)
AST SERPL-CCNC: 14 U/L (ref 15–37)
BASOPHILS NFR BLD AUTO: 0.5 % (ref 0–2)
BILIRUB SERPL-MCNC: 0.2 MG/DL (ref 0.2–1)
BUN SERPL-MCNC: 4 MG/DL (ref 7–18)
CALCIUM SERPL-MCNC: 8.5 MG/DL (ref 8.5–10.1)
CHLORIDE SERPL-SCNC: 104 MMOL/L (ref 98–107)
CO2 SERPL-SCNC: 28 MMOL/L (ref 21–32)
CREAT SERPL-MCNC: 0.8 MG/DL (ref 0.7–1.3)
EOSINOPHIL NFR BLD: 1.5 % (ref 0–3)
ERYTHROCYTE [DISTWIDTH] IN BLOOD BY AUTOMATED COUNT: 16 % (ref 10.5–14.5)
GLUCOSE SERPL-MCNC: 119 MG/DL (ref 74–106)
GRANULOCYTES NFR BLD MANUAL: 75.1 % (ref 36–66)
HCT VFR BLD CALC: 38 % (ref 42–52)
HGB BLD-MCNC: 12.5 GM/DL (ref 14–18)
LYMPHOCYTES NFR BLD AUTO: 14.7 % (ref 24–44)
MCH RBC QN AUTO: 28.7 PG (ref 26–34)
MCHC RBC AUTO-ENTMCNC: 33 G/DL (ref 28–37)
MCV RBC: 87.1 FL (ref 80–100)
MONOCYTES NFR BLD: 8.2 % (ref 1–8)
NEUTROPHILS # BLD: 6.8 THOU/UL (ref 1.4–8.2)
PLATELET # BLD: 365 THOU/UL (ref 150–400)
POTASSIUM SERPL-SCNC: 3.1 MMOL/L (ref 3.5–5.1)
PROT SERPL-MCNC: 7.6 G/DL (ref 6.4–8.2)
RBC # BLD AUTO: 4.37 MIL/UL (ref 4.5–6)
SODIUM SERPL-SCNC: 140 MMOL/L (ref 136–145)
WBC # BLD AUTO: 9.1 THOU/UL (ref 4–11)

## 2020-10-05 PROCEDURE — 10040 EXTRACTION: CPT

## 2020-10-06 VITALS — DIASTOLIC BLOOD PRESSURE: 75 MMHG | SYSTOLIC BLOOD PRESSURE: 134 MMHG

## 2020-10-06 VITALS — SYSTOLIC BLOOD PRESSURE: 139 MMHG | DIASTOLIC BLOOD PRESSURE: 74 MMHG

## 2020-10-06 VITALS — DIASTOLIC BLOOD PRESSURE: 79 MMHG | SYSTOLIC BLOOD PRESSURE: 98 MMHG

## 2020-10-06 VITALS — SYSTOLIC BLOOD PRESSURE: 131 MMHG | DIASTOLIC BLOOD PRESSURE: 72 MMHG

## 2020-10-06 LAB
ANION GAP SERPL CALC-SCNC: 8 MMOL/L (ref 7–16)
BILIRUB UR-MCNC: NEGATIVE MG/DL
BUN SERPL-MCNC: 7 MG/DL (ref 7–18)
CALCIUM SERPL-MCNC: 8.6 MG/DL (ref 8.5–10.1)
CHLORIDE SERPL-SCNC: 106 MMOL/L (ref 98–107)
CO2 SERPL-SCNC: 27 MMOL/L (ref 21–32)
COLOR UR: YELLOW
CREAT SERPL-MCNC: 0.8 MG/DL (ref 0.7–1.3)
ERYTHROCYTE [DISTWIDTH] IN BLOOD BY AUTOMATED COUNT: 15.9 % (ref 10.5–14.5)
GLUCOSE SERPL-MCNC: 95 MG/DL (ref 74–106)
HCT VFR BLD CALC: 37.3 % (ref 42–52)
HGB BLD-MCNC: 12.1 GM/DL (ref 14–18)
KETONES UR STRIP-MCNC: NEGATIVE MG/DL
MAGNESIUM SERPL-MCNC: 1.8 MG/DL (ref 1.8–2.4)
MCH RBC QN AUTO: 28.3 PG (ref 26–34)
MCHC RBC AUTO-ENTMCNC: 32.4 G/DL (ref 28–37)
MCV RBC: 87.2 FL (ref 80–100)
MUCUS: (no result) STRN/LPF
PLATELET # BLD: 341 THOU/UL (ref 150–400)
POTASSIUM SERPL-SCNC: 3.9 MMOL/L (ref 3.5–5.1)
RBC # BLD AUTO: 4.28 MIL/UL (ref 4.5–6)
RBC # UR STRIP: (no result) /UL
RBC #/AREA URNS HPF: (no result) /HPF (ref 0–2)
SODIUM SERPL-SCNC: 141 MMOL/L (ref 136–145)
SP GR UR STRIP: 1.02 (ref 1–1.03)
SQUAMOUS: (no result) /LPF (ref 0–3)
URINE CLARITY: (no result)
URINE GLUCOSE-RANDOM*: NEGATIVE
URINE LEUKOCYTES-REFLEX: (no result)
URINE NITRITE-REFLEX: POSITIVE
URINE PROTEIN (DIPSTICK): (no result)
URINE WBC-REFLEX: (no result) /HPF (ref 0–5)
UROBILINOGEN UR STRIP-ACNC: 0.2 E.U./DL (ref 0.2–1)
WBC # BLD AUTO: 6.6 THOU/UL (ref 4–11)

## 2020-10-06 NOTE — NUR
Assess due to notification of pt with chronic BL wounds, cellulitis, weeping.
Hx PVD, CHF, chronic lymphedema. Pt states appetite is good, requires feed
assist.  NPO for possible procedure. Wts fluctuate significantly 205-225 lb
and current wt up to 250 lb.  On lasix.  Once diet advanced, will order Ensure
Max 1x day for extra 30g protein and low calorie source.  Has vitamin D, zinc
and C already ordered.  Low nutrition risk with appropriate nutrition
interventions in place.

## 2020-10-06 NOTE — NUR
ASSUMED PT CARE THIS AM. PT CITAL SIGNS STABLE, A&OX4. PT NPO, WENT TO A HEART
HEALTHY DIET AFTER WOUND CONSULT. PT TOLERATED DIET AND MEDICATIONS WELL. PT
VOICED CONCERNS OF PAIN, RESPONSIVE TO PAIN MEDS PER EMAR. PT REFUSED Q 2
TURNS AND WEDGE APPLICATION. PT VOICED NEEDS, CATHETER PATENT. WOUND CARE
COMPLETED. ENDORSED TO NIGHT NURSE.

## 2020-10-06 NOTE — NUR
PT ADMITTED RELATED TO FALL,BLE CELLULITIS. CM REVIEWED CHART AND SPOKE WITH
CARE TEAM. CM ATTEMPTED PC TO PT'S ROOM X4 BUT PHONE WAS BUSY. PT IS FAMILIAR
TO CM FROM PREVIOUS ADMISSIONS. PT RESIDES IN A HOUSE WITH HIS SISTERS AND
MOTHER. PT'S SISTER IS HIS PAID CAREGIVER THROUGH [place] [employer]ICA[employer]. IT LOOKS
AS THOUGH PT HAS ALL NEEDED EQUIPTMENT IN THE HOME. LIFT DEVICE, HOSPITAL BED,
WC, BSC. IT HAD BEEN INDICATED THAT PT HAD BEEN ON SERVICE WITH [facility]
PTA.  ND ID CONSULTED. CM TO FOLLOW AS INDICATED WITH DC PLANNING.

## 2020-10-06 NOTE — NUR
PT ON SERVICE WITH [facility] FAXED CLINICAL UPDATE AND RECEIVED
CONFIRMATION LEFT MS WITH INTAKE. DP TO FOLLOW.

## 2020-10-06 NOTE — NUR
WOUND CARE DONE. REQUIRES 3 NURSES TO TURN PT. BOTTOM IS BLOODY, BARRIER CREAM
APPLIED. PT REFUSED TURNS DURING SHIFT, ABLE TO TURN AFTER DRESSING CHANGES.

## 2020-10-06 NOTE — NUR
Assumed pt care at 2330,admitted with wounds BLE/buttocks. A/OX4,VSS. Pt's max
assist with ADLs,uses a brandon lift at home/feeder. Generalized edema noted
allover,moreso on thighs/LE nonpitting.Weeping on blisters on BLE,wound care
done w/o any problems. C/o pain to back/BLE order obtained for Fentanyl
06mymD5 @ this time since pt is NPO for possible debridement. Penaloza patent to
DD with cloudy/yellow urine noted. IV in place on RFA w/ABTs infusing at this
time. Fall precautions in place,calls approp for help. Resting quietly at this
time,will continue to monitor pt.

## 2020-10-07 VITALS — SYSTOLIC BLOOD PRESSURE: 130 MMHG | DIASTOLIC BLOOD PRESSURE: 76 MMHG

## 2020-10-07 VITALS — DIASTOLIC BLOOD PRESSURE: 75 MMHG | SYSTOLIC BLOOD PRESSURE: 136 MMHG

## 2020-10-07 LAB
ANION GAP SERPL CALC-SCNC: 10 MMOL/L (ref 7–16)
BASOPHILS NFR BLD AUTO: 0.6 % (ref 0–2)
BUN SERPL-MCNC: 9 MG/DL (ref 7–18)
CALCIUM SERPL-MCNC: 8.3 MG/DL (ref 8.5–10.1)
CHLORIDE SERPL-SCNC: 105 MMOL/L (ref 98–107)
CO2 SERPL-SCNC: 26 MMOL/L (ref 21–32)
CREAT SERPL-MCNC: 0.8 MG/DL (ref 0.7–1.3)
EOSINOPHIL NFR BLD: 4.3 % (ref 0–3)
ERYTHROCYTE [DISTWIDTH] IN BLOOD BY AUTOMATED COUNT: 15.9 % (ref 10.5–14.5)
GLUCOSE SERPL-MCNC: 106 MG/DL (ref 74–106)
GRANULOCYTES NFR BLD MANUAL: 65.8 % (ref 36–66)
HCT VFR BLD CALC: 34.3 % (ref 42–52)
HGB BLD-MCNC: 11 GM/DL (ref 14–18)
LYMPHOCYTES NFR BLD AUTO: 17.6 % (ref 24–44)
MAGNESIUM SERPL-MCNC: 1.8 MG/DL (ref 1.8–2.4)
MCH RBC QN AUTO: 28 PG (ref 26–34)
MCHC RBC AUTO-ENTMCNC: 32 G/DL (ref 28–37)
MCV RBC: 87.5 FL (ref 80–100)
MONOCYTES NFR BLD: 11.7 % (ref 1–8)
NEUTROPHILS # BLD: 4.6 THOU/UL (ref 1.4–8.2)
PLATELET # BLD: 326 THOU/UL (ref 150–400)
POTASSIUM SERPL-SCNC: 3.9 MMOL/L (ref 3.5–5.1)
RBC # BLD AUTO: 3.92 MIL/UL (ref 4.5–6)
SODIUM SERPL-SCNC: 141 MMOL/L (ref 136–145)
WBC # BLD AUTO: 6.9 THOU/UL (ref 4–11)

## 2020-10-07 NOTE — NUR
ASSUMED PT CARE THIS AM. PT VITAL SIGNS STABLE, A&OX4. PT REFUSES Q 2 TURNS
STATING THAT THE WEDGE IS UNCOMFORTABLE. NURSE EDUCATED ON IMPORTANCE OF
POSITIONING WEDGE AND Q2 TURNS. PT GIVEN BATH, KHUSHI CARE GIVEN, BARRIER CREAM
PLACED. WOUND PICTURES TAKEN. WILL CONTINUE TO MONITOR.

## 2020-10-07 NOTE — NUR
CM FOLLOWED UP WIHT PT AT BEDSIDE THIS AFTERNOON. PT CONFIRMED HE HAD BEEN AT
HOME PTA WITH DME AND HIS SISTER AS HIS CAREGIVER THROUGH [employer].WOUND 
CONSULTED. PT IS ON IV VAN Q12 AND IV ZOSYN Q8. CM ASKED IF PT WAS RECEPTIVE
TO GOING FOR A POST ACUTE CARE STAY IF HE REQUIRED PROLONGED IV ABX AND PT
INDICATED HE WOULD PREFER NOT TO IF POSSIBLE AND HOPES TO RETURN HOME ONCE
MEDICALLY STABLE AND RESUME SERVICES WITH [facility]. CM TO FOLLOW
AS INDICATED WITH DC PLANNING.

## 2020-10-07 NOTE — NUR
Assumed pt care at 1900. A/OX4,VSS.C/o pain to BLE,medicated per EMAR with
relief reported. Pt agreed to be repositioned a few times during the shift.
Dsgs to BLE C/D/I. Fall precautions in place,calls approp for help.

## 2020-10-08 VITALS — DIASTOLIC BLOOD PRESSURE: 83 MMHG | SYSTOLIC BLOOD PRESSURE: 126 MMHG

## 2020-10-08 VITALS — SYSTOLIC BLOOD PRESSURE: 146 MMHG | DIASTOLIC BLOOD PRESSURE: 86 MMHG

## 2020-10-08 LAB
ANION GAP SERPL CALC-SCNC: 6 MMOL/L (ref 7–16)
BUN SERPL-MCNC: 9 MG/DL (ref 7–18)
CALCIUM SERPL-MCNC: 8.4 MG/DL (ref 8.5–10.1)
CHLORIDE SERPL-SCNC: 101 MMOL/L (ref 98–107)
CO2 SERPL-SCNC: 29 MMOL/L (ref 21–32)
CREAT SERPL-MCNC: 1 MG/DL (ref 0.7–1.3)
ERYTHROCYTE [DISTWIDTH] IN BLOOD BY AUTOMATED COUNT: 15.8 % (ref 10.5–14.5)
GLUCOSE SERPL-MCNC: 104 MG/DL (ref 74–106)
HCT VFR BLD CALC: 36.3 % (ref 42–52)
HGB BLD-MCNC: 11.6 GM/DL (ref 14–18)
MCH RBC QN AUTO: 28.1 PG (ref 26–34)
MCHC RBC AUTO-ENTMCNC: 32 G/DL (ref 28–37)
MCV RBC: 87.9 FL (ref 80–100)
PLATELET # BLD: 321 THOU/UL (ref 150–400)
POTASSIUM SERPL-SCNC: 3.7 MMOL/L (ref 3.5–5.1)
RBC # BLD AUTO: 4.13 MIL/UL (ref 4.5–6)
SODIUM SERPL-SCNC: 136 MMOL/L (ref 136–145)
WBC # BLD AUTO: 7.3 THOU/UL (ref 4–11)

## 2020-10-08 NOTE — NUR
ASSUMED CARE OF PT AT 1900HRS. PT AOX4 AND LETS NEEDS BE KNOWN. FALL
PRECAUTION IN PLACE. CARPIO IN PLACE AND IS PATIENT. PAIN CONTROLLED WITH PRN
PAIN MEDS. PT REFUSING TURNS. NUNEZ IN PLACE. PT WAS ABLE TO GET COMFORTABLE AND
SLEEP PART OF THE SHIFT. VSS AND NO S/S OF ACUTE DISTRESS. WILL CONTINUE TO
MONITOR.

## 2020-10-09 VITALS — SYSTOLIC BLOOD PRESSURE: 142 MMHG | DIASTOLIC BLOOD PRESSURE: 88 MMHG

## 2020-10-09 VITALS — DIASTOLIC BLOOD PRESSURE: 71 MMHG | SYSTOLIC BLOOD PRESSURE: 109 MMHG

## 2020-10-09 VITALS — SYSTOLIC BLOOD PRESSURE: 148 MMHG | DIASTOLIC BLOOD PRESSURE: 108 MMHG

## 2020-10-09 VITALS — DIASTOLIC BLOOD PRESSURE: 86 MMHG | SYSTOLIC BLOOD PRESSURE: 124 MMHG

## 2020-10-09 LAB
ANION GAP SERPL CALC-SCNC: 9 MMOL/L (ref 7–16)
BUN SERPL-MCNC: 9 MG/DL (ref 7–18)
CALCIUM SERPL-MCNC: 8.2 MG/DL (ref 8.5–10.1)
CHLORIDE SERPL-SCNC: 104 MMOL/L (ref 98–107)
CO2 SERPL-SCNC: 26 MMOL/L (ref 21–32)
CREAT SERPL-MCNC: 0.9 MG/DL (ref 0.7–1.3)
ERYTHROCYTE [DISTWIDTH] IN BLOOD BY AUTOMATED COUNT: 15.7 % (ref 10.5–14.5)
GLUCOSE SERPL-MCNC: 94 MG/DL (ref 74–106)
HCT VFR BLD CALC: 32.4 % (ref 42–52)
HGB BLD-MCNC: 10.5 GM/DL (ref 14–18)
MCH RBC QN AUTO: 28.4 PG (ref 26–34)
MCHC RBC AUTO-ENTMCNC: 32.4 G/DL (ref 28–37)
MCV RBC: 87.5 FL (ref 80–100)
PLATELET # BLD: 286 THOU/UL (ref 150–400)
POTASSIUM SERPL-SCNC: 3.7 MMOL/L (ref 3.5–5.1)
RBC # BLD AUTO: 3.7 MIL/UL (ref 4.5–6)
SODIUM SERPL-SCNC: 139 MMOL/L (ref 136–145)
WBC # BLD AUTO: 6.1 THOU/UL (ref 4–11)

## 2020-10-09 NOTE — NUR
ASSUMED CARE OF PT AT 1900HRS. PT AOX4 ADN LETS NEEDS BE KNOWN. FALL
PRECAUTION IN PLACE. ASSESSENT CHARTED. PT REFUSED TURNS. PT REPORTED PAIN AND
WAS TREATED WITH PO PAIN MEDS. ABX TREATMENT CONTINUED. LYMPHEDEMA WRAPS ON.
PT DENIED NAUSEA OR SOA. VSS AND NO S/S OF ACUTE DISTRESS. WILL CONTINUE TO
MONITOR.

## 2020-10-09 NOTE — NUR
FAXED REFERRAL TO [facility] RECEIVED CONFIRMATION PT WILL NOT DC OVER WEEKEND
WILL F/U WITH THEM ON MONDAY.

## 2020-10-09 NOTE — NUR
PT CONTINUES ON IV ABX VANC AND ZOSYN. PHYSICIAN INDICATED THAT PT WILL BE
HERE OVER WEEKEND FOR CONTINUES TREATMENT. CM RECEIVED PC FROM [facility] WITH [facility], SHE INDICATED PT HAD BEEN ON SERVICE WITH THEM PTA. CM NOTIFIED
[facility] THAT PT HAD BEEN ON SERVICE WITH THEM PTA AND CLINICAL UPDATE WAS
SENT TO [facility]. PT HOPES TO BE ABLE TO RETURN HOME WITH [facility] SERVICES
AND HIS SISTER TO RESUME SERVICES THROUGH [employer]. CM TO FOLLOW AS INDICATED WITH
DC PLANNING.

## 2020-10-10 VITALS — DIASTOLIC BLOOD PRESSURE: 88 MMHG | SYSTOLIC BLOOD PRESSURE: 132 MMHG

## 2020-10-10 VITALS — DIASTOLIC BLOOD PRESSURE: 87 MMHG | SYSTOLIC BLOOD PRESSURE: 145 MMHG

## 2020-10-10 VITALS — SYSTOLIC BLOOD PRESSURE: 145 MMHG | DIASTOLIC BLOOD PRESSURE: 76 MMHG

## 2020-10-10 NOTE — NUR
Pt. rested at only short intervals during the night when checked on during
frequent rounds.  He refuses to be turned and has been educated on the
importance of this.  Po pain meds given (see emar) for c/o pain to his lower
legs with some relief noted.  Bed alarm is on.

## 2020-10-10 NOTE — NUR
PT CARE ASSUMED 0700. A&Ox4. FORGETFUL. SISTER AT BED SITE. BED BATH GIVEN. PT
WAS ABLE TO FEED HIMSELF FOR ALL MEAL TODAYWHEN SETTING UP HIS TRAYS. TOUCH
CALL LIGHT IN ROOM. IV PATENT WITH NO REDNESS OR EDEMA, SALINE LOCKED. CARPIO
IN PLACE. PT REFUSES TO DO Q2 TURNS EVEN WITH ENCOURAGEMENT. WOUND CARE
PERFORMED ON BILATERAL LEGS. AIRLOSS PUMP IN PLACE. WOUND ON BOTTOM BARRIER
CREAM APPLIED. IV ANTIBIOTICS GIVEN. RA. PT VERY UNPLEASANT MOST OF THE
MORNING UNTIL HIS SISTER CAME INTO THE ROOM THEN VERY PLEASANT. FALL PROTOCOL
IN PLACE. CALL LIGHT IN REACH. CALL LIGHT IN REACH.

## 2020-10-11 VITALS — DIASTOLIC BLOOD PRESSURE: 78 MMHG | SYSTOLIC BLOOD PRESSURE: 128 MMHG

## 2020-10-11 VITALS — DIASTOLIC BLOOD PRESSURE: 97 MMHG | SYSTOLIC BLOOD PRESSURE: 145 MMHG

## 2020-10-11 VITALS — DIASTOLIC BLOOD PRESSURE: 71 MMHG | SYSTOLIC BLOOD PRESSURE: 126 MMHG

## 2020-10-11 LAB
ANION GAP SERPL CALC-SCNC: 10 MMOL/L (ref 7–16)
BUN SERPL-MCNC: 10 MG/DL (ref 7–18)
CALCIUM SERPL-MCNC: 8.4 MG/DL (ref 8.5–10.1)
CHLORIDE SERPL-SCNC: 103 MMOL/L (ref 98–107)
CO2 SERPL-SCNC: 27 MMOL/L (ref 21–32)
CREAT SERPL-MCNC: 0.9 MG/DL (ref 0.7–1.3)
ERYTHROCYTE [DISTWIDTH] IN BLOOD BY AUTOMATED COUNT: 15.5 % (ref 10.5–14.5)
GLUCOSE SERPL-MCNC: 105 MG/DL (ref 74–106)
HCT VFR BLD CALC: 33.5 % (ref 42–52)
HGB BLD-MCNC: 10.8 GM/DL (ref 14–18)
MCH RBC QN AUTO: 28.5 PG (ref 26–34)
MCHC RBC AUTO-ENTMCNC: 32.4 G/DL (ref 28–37)
MCV RBC: 88.1 FL (ref 80–100)
PLATELET # BLD: 318 THOU/UL (ref 150–400)
POTASSIUM SERPL-SCNC: 3.6 MMOL/L (ref 3.5–5.1)
RBC # BLD AUTO: 3.8 MIL/UL (ref 4.5–6)
SODIUM SERPL-SCNC: 140 MMOL/L (ref 136–145)
WBC # BLD AUTO: 6.3 THOU/UL (ref 4–11)

## 2020-10-11 NOTE — NUR
Assumed pt care at 1900. Pt's A/OX4,VSS. C/o BLE pain 6/10 medicated per EMAR
with relief reported. Pt needs assist with meals but able to grab finger foods
for snacks. Declined being repositioned through the shift stating he's
comfortable on his back;on a DEMAR mattress with heel protectors in place. BLE
dressing C/D/I. Penaloza patent to DD with light yellow urine draining. IV patent
to LFA w/ABTs infusing. Fall precautions in place,calls approp for help.

## 2020-10-11 NOTE — NUR
ASSUMED PT CARE THIS AM. PT CITAL SIGNS STABLE, A&OX4. PT IV PATENT, FLUIDS
RAN AND MEDICATIONS GIVEN WITHOUT COMPLAINT. PT CARPIO PATENT. PT REFUSED Q2
TURNS. WOUND CARE DONE TO LEGS, DRESSINGS CHANGED. WOUND CARE DONE TO BOTTOM,
BARRIER CREAM APPLIED. PT CALLED OUT WHEN NEEDED. PT AGITATED WITH WOUND CARE,
COMPLAINING THAT HE DIDNT WANT TO BE MOVED. BATH GIVEN. ENDORSED TO NIGHT
NURSE.

## 2020-10-12 VITALS — SYSTOLIC BLOOD PRESSURE: 143 MMHG | DIASTOLIC BLOOD PRESSURE: 78 MMHG

## 2020-10-12 VITALS — DIASTOLIC BLOOD PRESSURE: 95 MMHG | SYSTOLIC BLOOD PRESSURE: 143 MMHG

## 2020-10-12 VITALS — DIASTOLIC BLOOD PRESSURE: 90 MMHG | SYSTOLIC BLOOD PRESSURE: 141 MMHG

## 2020-10-12 NOTE — NUR
ASSUMED CARE AT SHIFT CHANGE. PT A/O X 4, PLESANT AND CALM THROUGHOUT SHIFT.
IV ATBX PER MAR FOR CELLULITIS. WOUND CARE COMPLETED BY OT, NO DRAINAGE,
HEALING TISSUE NOTED. PER PILAR WITH ID- PT CAN BE OUT OF ISOLATION IF WOUNDS
ARE NOT DRAINING. PT MEDICATED FOR PAIN THIS AFTERNOON. REPOSITIONED IN BED
WHEN AGREEABLE. PT UPDATED ON POC AND PLAN FOR SKILLED UPON DC. WILL CONT TO
MONITOR AND FOLLOW POC.

## 2020-10-12 NOTE — NUR
ASSUMED CARE OF PT AT 1900HRS. PT AOX4 AND LETS NEEDS BE KNOWN. FALL
PRECAUTION IN PLACE. CARPIO IN PLACE AND IS PATIENT. PT REPORTED PAIN AND WAS
TREATED WITH PRN PAIN MEDS. PT DENIED TURNS. PT DENIES NAUSEA OR SOA. PT WAS
ABLE TO GET COMFORTABLE AND SLEEP PART OF THE SHIFT. VSS AND NO S/S OF ACUTE
DISTRESS. WILL CONTINUE TO MONITOR.
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
## 2020-10-13 VITALS — DIASTOLIC BLOOD PRESSURE: 87 MMHG | SYSTOLIC BLOOD PRESSURE: 149 MMHG

## 2020-10-13 VITALS — SYSTOLIC BLOOD PRESSURE: 130 MMHG | DIASTOLIC BLOOD PRESSURE: 84 MMHG

## 2020-10-13 LAB
ANION GAP SERPL CALC-SCNC: 9 MMOL/L (ref 7–16)
BASOPHILS NFR BLD AUTO: 0.7 % (ref 0–2)
BUN SERPL-MCNC: 9 MG/DL (ref 7–18)
CALCIUM SERPL-MCNC: 8.6 MG/DL (ref 8.5–10.1)
CHLORIDE SERPL-SCNC: 105 MMOL/L (ref 98–107)
CO2 SERPL-SCNC: 26 MMOL/L (ref 21–32)
CREAT SERPL-MCNC: 0.9 MG/DL (ref 0.7–1.3)
EOSINOPHIL NFR BLD: 3.8 % (ref 0–3)
ERYTHROCYTE [DISTWIDTH] IN BLOOD BY AUTOMATED COUNT: 15.8 % (ref 10.5–14.5)
GLUCOSE SERPL-MCNC: 108 MG/DL (ref 74–106)
GRANULOCYTES NFR BLD MANUAL: 65.8 % (ref 36–66)
HCT VFR BLD CALC: 35.5 % (ref 42–52)
HGB BLD-MCNC: 11.3 GM/DL (ref 14–18)
LYMPHOCYTES NFR BLD AUTO: 18.1 % (ref 24–44)
MCH RBC QN AUTO: 28.1 PG (ref 26–34)
MCHC RBC AUTO-ENTMCNC: 31.9 G/DL (ref 28–37)
MCV RBC: 88 FL (ref 80–100)
MONOCYTES NFR BLD: 11.6 % (ref 1–8)
NEUTROPHILS # BLD: 3.9 THOU/UL (ref 1.4–8.2)
PLATELET # BLD: 344 THOU/UL (ref 150–400)
POTASSIUM SERPL-SCNC: 3.6 MMOL/L (ref 3.5–5.1)
RBC # BLD AUTO: 4.03 MIL/UL (ref 4.5–6)
SODIUM SERPL-SCNC: 140 MMOL/L (ref 136–145)
WBC # BLD AUTO: 6 THOU/UL (ref 4–11)

## 2020-10-13 NOTE — NUR
CM SPOKE WITH PT'S SISTER THIS AFTERNOON AND PROVIDED UPDATED THAT PT WILL
NEED PROLONGED IV ABX TREATMENT VANC AND ZOSYN AND CONTINUED WC AND NOW
LYMPHEDEMA. SHE INDICATED THAT SHE AND HER FAMILY WOULDN'T BE COMFORTABLE TO
DO HOME INFUSION AT HOME. CM REITERATED THAT THE ALTERNATIVE WOULD BE GOING TO
A FACILITY FOR SERVICES. SISTER SEEMA TO VISIT THIS EVENING AND LOOK LIST OVER
WITH PT. CM TO FOLLOW UP WITH THEM TO SEE WHERE THEY WANT REFERRALS SENT FOR
POSSIBLE ADMISSION.

## 2020-10-13 NOTE — NUR
remains in bed .lungs are clear.adequate amount of urine in frederick.remains
afebral .dressings are dry.turned to right side and remained for only 30 mins.
refused to stay off his back even after education on remaining on right
side.and the importance of turning.

## 2020-10-13 NOTE — NUR
Followup: continues treatment for bilateral lower extremity wounds.  No new wt
to assess since 10/6.  Past wts usually 205-225 lb.  Has been eating %
of meals and drinks 1 Ensure Max supplement per day.  Remains at low nutrition
risk with appropriate nutrition interventions in place

## 2020-10-13 NOTE — NUR
ASSUMED CARE OF PT AT 1900 HRS. PT AOX4 AAND LETS NEEDS BE KNOWN. FALL
PRECAUTION IN [place]. PT IS BED BOUND. ASSESSMENT CHARTED. CARPIO IN PLACE AND
IS PATIENT. PT REPORTED PAIN AND WAS TREATED WITH PRN PAIN MEDS. PT DENIED
NAUSEA OR SOA. PT WAS ABLE TO SLEEP PART OF THE SHIFT. VSS AND NO S/S OF ACUTE
DISTRESS. WILL CONTINUE TO MONITOR.

## 2020-10-13 NOTE — NUR
Assumed pt care this am, VS stablee. Pt is very needy and would call for the
smallest things through out the day. Refused q2 turns, explained the reasons
why and the would on the scarum is getting worse. Pt made it clear he is aware
of the consequensces and still wanted NOT to be turned. POC followed, endorsed
[facility] night nurse. Wound care on the sacrum completed.

## 2020-10-14 VITALS — DIASTOLIC BLOOD PRESSURE: 90 MMHG | SYSTOLIC BLOOD PRESSURE: 144 MMHG

## 2020-10-14 VITALS — DIASTOLIC BLOOD PRESSURE: 82 MMHG | SYSTOLIC BLOOD PRESSURE: 138 MMHG

## 2020-10-14 NOTE — NUR
PT A&OX4, VSS, GENERALIZED PAIN. PAIN MEDICATION GIVEN. WOUND CARE COMPLETE.
PATIENT NEEDS FOOD TRAY SET UP. CARPIO CATHETER INTACT. PHOTOS OF WOUNDS TAKEN.
PATIENT REFUSED Q2 TURNS. NO SIGNS OF DISTRESS. WILL CONTINUE TO MONITOR.

## 2020-10-14 NOTE — NUR
CM FOLLOWED UP WITH PT THIS AM AND HE INDICATED THAT HIS SISTER MELBA HAD
TAKEN LIST HOME. CM CALLED AND SPOKE WITH MOY AND SHE INDICATED THEY WERE
ALL GOING TO DISCUSS. CM GOT VM ROM MELBA AND THEY WANT IDEA OF DURATION OF IV
ABX TREATMENT. CM NOTIFED NURSE TO SEE IF SHE CAN GET ANY IDEA FROM [facility]. CM
TO FOLLOW UP WITH PTS' SISTERS EARLY TOMORROW.

## 2020-10-15 VITALS — SYSTOLIC BLOOD PRESSURE: 122 MMHG | DIASTOLIC BLOOD PRESSURE: 87 MMHG

## 2020-10-15 VITALS — DIASTOLIC BLOOD PRESSURE: 83 MMHG | SYSTOLIC BLOOD PRESSURE: 129 MMHG

## 2020-10-15 VITALS — DIASTOLIC BLOOD PRESSURE: 94 MMHG | SYSTOLIC BLOOD PRESSURE: 139 MMHG

## 2020-10-15 NOTE — NUR
FAXED REFERRAL TO [facility] RECEIVED CONFIRMATION AND LEFT MSG WITH LAURENCE
IN ADM.

## 2020-10-15 NOTE — NUR
pharmacy voiced that the staff administrated vancomycin as ordered in emar.
vancomycin trough is 14.

## 2020-10-15 NOTE — NUR
[facility] CAN'T ACCEPT AS PT ISN'T 62, [facility] CAN ACCEPT, [facility] CHOICE IS STILL REVIEWING. CM TO FOLLOW AS INDICATED WITH DC PLANNING.

## 2020-10-15 NOTE — NUR
CM FOLLOWED UP WITH PT'S SISTER MELBA THIS AM. SHE INDICATED THAT REFERRAL
COULD BE SENT TO [facility] FOR REVIEW FOR POSSIBLE ADMISSION. CM TO FOLLOW AS
INDICATED WITH POSSIBLE DC PLANNING.

## 2020-10-15 NOTE — NUR
A/O, calm and cooperative. no n/v. complained of pain in legs, pain medication
given and worked. bed bath given.

## 2020-10-15 NOTE — NUR
PERIODS OF SLEEP AND WAKE FOR PT, REQUESTING PAIN MEDICAITON AND WATER.
PLEASANT, DIFFICULT TO UNDERSTAND AT TIMES.

## 2020-10-16 VITALS — DIASTOLIC BLOOD PRESSURE: 89 MMHG | SYSTOLIC BLOOD PRESSURE: 142 MMHG

## 2020-10-16 LAB
ANION GAP SERPL CALC-SCNC: 7 MMOL/L (ref 7–16)
BUN SERPL-MCNC: 9 MG/DL (ref 7–18)
CALCIUM SERPL-MCNC: 8.6 MG/DL (ref 8.5–10.1)
CHLORIDE SERPL-SCNC: 104 MMOL/L (ref 98–107)
CO2 SERPL-SCNC: 29 MMOL/L (ref 21–32)
CREAT SERPL-MCNC: 1.1 MG/DL (ref 0.7–1.3)
ERYTHROCYTE [DISTWIDTH] IN BLOOD BY AUTOMATED COUNT: 15.6 % (ref 10.5–14.5)
GLUCOSE SERPL-MCNC: 111 MG/DL (ref 74–106)
HCT VFR BLD CALC: 33.2 % (ref 42–52)
HGB BLD-MCNC: 10.6 GM/DL (ref 14–18)
MCH RBC QN AUTO: 28.1 PG (ref 26–34)
MCHC RBC AUTO-ENTMCNC: 31.9 G/DL (ref 28–37)
MCV RBC: 88 FL (ref 80–100)
PLATELET # BLD: 390 THOU/UL (ref 150–400)
POTASSIUM SERPL-SCNC: 3.3 MMOL/L (ref 3.5–5.1)
RBC # BLD AUTO: 3.78 MIL/UL (ref 4.5–6)
SODIUM SERPL-SCNC: 140 MMOL/L (ref 136–145)
WBC # BLD AUTO: 6.4 THOU/UL (ref 4–11)

## 2020-10-16 NOTE — NUR
PT DISCHARGING TODAY TO [facility] FAXED AND
CONFIRMATION RECEIVED. ARRANGED TRANSPORT BY [facility] FOR [phone]
TODAY. PT'S FAMILY NOTIFIED AND UNIT NOTIFIED.

## 2020-10-16 NOTE — NUR
ASSUMED PT CARE THIS AM. PT A&OX4, VITAL SIGNS STABLE. IV PATENT, MEDS GIVEN
WITHOUT COMPLAINT FROM PT. URINARY CATHETER PATENT. PT REPORTED PAIN, MANAGED
WITH MEDICATION. WOUND DRESSINGS ON LEGS CHANGED, NO OPEN SORES. BARRIER CREAM
APPLIED TO BOTTOM, WOUND PICTURES TAKEN ON BOTTOM AND LEGS. PT DISCHARGED,
SENT WITH URINARY CATHETER AND IV.

## 2020-10-16 NOTE — NUR
ASSUMED CARE OF PT AT 1900. PT IS A/O X4 AND CURRENTLY BEDREST. PT C/O PAIN TO
BLE. DRSGS ARE C/D/I. NO WOUND CARE COMPLETED THIS SHIFT. AT THIS TIME PT IS
LYING IN HIS BED AND APPEARS TO BE SLEEPING. WILL CONTINUE TO MONITOR.

## 2020-10-16 NOTE — NUR
REFERRAL WAS SENT TO [facility] PER SISTER'S REQUEST THEY CAN'T ACCEPT AS THEY
DON'T USE [facility] IN HOUSE. [facility] INDICATED THAT THEY COULDN'T ACCEPT.
[facility] IS ACCEPTING. CM CALLED AND NOTIFIED MELBA. CM NOTIFIED PT. CARE
TEAM INDICATED THAT PT IS MEDICALLY STABLE TO DC TO [facility] THIS
DAY. CHART COPY ORDERD. ORDERS TO BE FAXED ONCE COMPLETED. [facility]
TRANSPORT TO BE ARRANGED. REPORT TO BE CALLED TO [phone].

## 2020-11-06 ENCOUNTER — HOSPITAL ENCOUNTER (OUTPATIENT)
Dept: HOSPITAL 35 - TELEPC | Age: 60
End: 2020-11-06
Payer: COMMERCIAL

## 2020-11-06 DIAGNOSIS — M79.605: ICD-10-CM

## 2020-11-06 DIAGNOSIS — M79.642: ICD-10-CM

## 2020-11-06 DIAGNOSIS — Z79.899: ICD-10-CM

## 2020-11-06 DIAGNOSIS — M79.641: ICD-10-CM

## 2020-11-06 DIAGNOSIS — G89.29: Primary | ICD-10-CM

## 2020-11-06 DIAGNOSIS — M79.604: ICD-10-CM

## 2020-11-06 DIAGNOSIS — I10: ICD-10-CM

## 2021-01-06 ENCOUNTER — HOSPITAL ENCOUNTER (OUTPATIENT)
Dept: HOSPITAL 35 - TELEPC | Age: 61
End: 2021-01-06
Attending: CLINICAL NURSE SPECIALIST
Payer: COMMERCIAL

## 2021-01-06 DIAGNOSIS — M79.652: ICD-10-CM

## 2021-01-06 DIAGNOSIS — G20: ICD-10-CM

## 2021-01-06 DIAGNOSIS — M79.604: Primary | ICD-10-CM

## 2021-01-06 DIAGNOSIS — R59.0: ICD-10-CM

## 2021-01-06 DIAGNOSIS — Z86.69: ICD-10-CM

## 2021-01-06 DIAGNOSIS — Z79.899: ICD-10-CM

## 2021-01-06 DIAGNOSIS — M79.605: ICD-10-CM

## 2021-01-06 DIAGNOSIS — N39.0: ICD-10-CM

## 2021-01-06 DIAGNOSIS — Z88.8: ICD-10-CM

## 2021-01-06 DIAGNOSIS — F11.20: ICD-10-CM

## 2021-01-06 NOTE — HPC
[facility]
[address]
[place]   [address]                     PAIN MANAGEMENT CONSULTATION  
_______________________________________________________________________________
 
Name:       VIOLA PARR ECTOR                Room #:                     REG VALENCIA MAN#:      [id number]                       Account #:      [id number]  
Admission:  01/06/21    Attend Phys:    Codie Noriega     
Discharge:              Date of Birth:  [birth year]  
                                                          Report #: [id number]
                                                                    [id number]   
_______________________________________________________________________________
THIS REPORT FOR:  
 
cc:  Pennie Chaudhari MD, Nora P. MD Hocker,Codie SHEIKH                                            ~
DATE OF SERVICE:  01/06/2021
 
 
This is a telemedicine appointment that the patient has consented for from 9:15
to 9:35 due to the coronavirus and his comorbidities.
 
CHIEF COMPLAINT:  Bilateral lower extremity pain.
 
HISTORY OF PRESENT ILLNESS:  This is a pleasant 60-year-old gentleman who
speaking via the telephone for a Telemed appointment.  He is at high risk for
COVID and has been at home, staying safe.  Today, he is reporting a pain score
of 4/10, most significantly his pain is located in his lower back and lower
extremities.  He does report his lower extremity cellulitis has cleared, though
he continues to have home health nurse come once a week to wrap his legs.  He
states he has been out of the hospital for the last 2 months, which he is
thankful for.  He reports no further wounds at this time.  He feels his
medications are beneficial in helping his overall pain, occasionally, not
utilizing all of his OxyIR tablets in a day because he is doing well.  He does
have issues with constipation occasionally and does take Dulcolax that helps
resolve this issue.
 
ALLERGIES:  MORPHINE.
 
CURRENT LIST OF MEDICATIONS:  Tizanidine p.r.n., OxyIR 10 mg p.r.n., oxycodone 5
mg p.r.n., vancomycin, zinc, ascorbic acid, Protonix, tamsulosin, Requip,
vitamin B12, vitamin D3, Lasix, potassium and carbidopa/levodopa.
 
PQRS:
1.  He has arthritic changes in his lower extremities and back.  Denies any
rheumatoid arthritis.
2.  Height and weight and vital signs are deferred due to a Telemed appointment.
3.  Pain score 4/10.
4.  Denies dizziness.  He needs assistance with walking.  He is in a wheelchair
at all times due to his Parkinson's.  He has not fallen in the last 3 months.
5.  The patient is not on any blood thinners, but does take medicine for
hypertension.
6.  Opioid therapy is greater than 6 weeks; therefore, an opioid signed contract
is on the chart.  Risk assessment is low.  Functional assessment is 40/70.
7.  Recreational drug use, he denies.  He is not a smoker and does not drink
alcohol.
 
 
 
 
[address]   [address]                     PAIN MANAGEMENT CONSULTATION  
_______________________________________________________________________________
 
Name:       KESHAVVIOLA                Room #:                     REG ARACELI MAN#:      [id number]                       Account #:      [id number]  
Admission:  01/06/21    Attend Phys:    Codie Noriega     
Discharge:              Date of Birth:  [birth year]  
                                                          Report #: [id number]
                                                                    [id number]   
_______________________________________________________________________________
According to the prescription monitoring system, he has filled last in December
and he is due to fill his medications today, filling them in a timely fashion. 
His morphine milliequivalent is 50 MMEs.
 
PHYSICAL EXAMINATION AND REVIEW OF SYSTEMS:  He is alert and orientated
gentleman, answering all my questions appropriately, slightly upbeat today
reporting he had great holidays with his family at home.  He denies drainage or
wounds in his lower extremities.  Reporting pain in his lower extremities is
bothersome with edema and Ace wrap bandages.
 
IMPRESSION:
1.  History of Parkinson's disease.
2.  Chronic lymphedema of his lower extremities.
3.  Left thigh pain.
4.  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
5.  Urinary tract infections due to suprapubic catheter.
6.  History of Clostridium difficile.
7.  Opioid medications under written agreement.
 
PLAN:
1.  We discussed treatment options via the telephone today.  He believes his
OxyIR 5 mg and 10 mg have been beneficial in helping relieve his pain, some
days, not needing the full allotment that we prescribed.  He would like those
continued today.  We will have Dr. Archer send this electronically for 2 months.
2.  The patient does report that he takes his tizanidine very sparingly, but
believes he does need a refill of his tizanidine 2 mg tablets and I will send
those for 2 additional months as well.
3.  We did discuss the COVID vaccine and encouraged the patient to call his
neurologist and primary care doctor to see when he would be eligible to have
this injection and if they feel that it is safe with his Parkinson's.
4.  This Telemed was completed in collaboration with Dr. Archer.
 
 
 
 
 
 
 
 
 
 
 
 
 
  <ELECTRONICALLY SIGNED>
   By: Codie Noriega             
  01/06/21     1326
D: 01/06/21 0938                           _____________________________________
T: 01/06/21 0957                           Codie Noriega               /nt

## 2021-03-12 ENCOUNTER — HOSPITAL ENCOUNTER (OUTPATIENT)
Dept: HOSPITAL 35 - PAIN | Age: 61
End: 2021-03-12
Attending: CLINICAL NURSE SPECIALIST
Payer: COMMERCIAL

## 2021-03-12 DIAGNOSIS — G20: ICD-10-CM

## 2021-03-12 DIAGNOSIS — R59.0: Primary | ICD-10-CM

## 2021-03-12 DIAGNOSIS — F11.20: ICD-10-CM

## 2021-03-15 NOTE — HPC
[facility]
[address]
[place]   [address]                     PAIN MANAGEMENT CONSULTATION  
_______________________________________________________________________________
 
Name:       KESHAVVIOLA ECTOR                [facility]                     [facility]      [id number]                       Account #:      [id number]  
Admission:  03/12/21    Attend Phys:    Codie Noriega     
Discharge:              Date of Birth:  [birth year]  
                                                          Report #: [id number]
                                                                    [id number]   
_______________________________________________________________________________
THIS REPORT FOR:  
 
cc:  Pennie Chaudhari MD, Nora P. MD Hocker, Amanda CNS                                            ~
DATE OF SERVICE:  03/12/2021
 
 
CHIEF COMPLAINT:  Bilateral lower extremity pain.
 
This is a telemedicine appointment that I am speaking via the telephone due to
the COVID virus from 10:30 to 10:45.  The nurse previously called for
information as well.  They have consented for this Telemed appointment.
 
HISTORY OF PRESENT ILLNESS:  This is a pleasant 60-year-old gentleman who is
answering all my questions appropriately on the phone today, he is reporting his
pain score 4/10 in his bilateral legs.  He does believe that his pain is well
controlled with his oxycodone and denies some constipation, but has been taking
MiraLax on a daily basis.  The patient reports he has not been utilizing his
muscle relaxant.  He does not feel that is necessarily presently.  Overall, he
believes he is doing quite well on his current regimen.
 
The patient does report a home health nurse coming for wound management on his
sacral area.  He thinks it is healing per their report.  His sister is with him
presently today helping answer some of the questions.
 
ALLERGIES:  MORPHINE.
 
CURRENT LIST OF MEDICATIONS:  OxyIR 10 mg p.r.n., oxycodone 5 mg p.r.n.,
vancomycin, zinc, ascorbic acid, Protonix, tamsulosin, Requip, vitamin B12,
vitamin D3, Lasix, potassium, carbidopa/levodopa, MiraLax.
 
PQRS:
1.  He has arthritic changes in his lower extremity and back.  Denies any
rheumatoid arthritis.
2.  Height, weight and vital signs are deferred due to a Telemed appointment. 
Pain score is 4/10.  The patient denies dizziness.  Per the sister's report, he
is in bed, not utilizing his wheelchair currently.  The patient is not on any
blood thinners, but does take medicine for hypertension.  Opioid therapy is
greater than 6 weeks, and opioid signed contract is on the chart.  Risk
assessment is low.  Functional assessment is 40/70.
3.  Recreational drug use, he denies.  He does not smoke and he does not drink
alcohol.
 
According to the prescription monitoring system, the patient is filling
appropriately.  He was given a script to fill on 03/05/2021.  His morphine mEq
 
 
 
[place] [address]                     PAIN MANAGEMENT CONSULTATION  
_______________________________________________________________________________
 
Name:       JUDAH PARRVINI BARNEY                [facility]                     [facility]      [id number]                       Account #:      [id number]  
Admission:  03/12/21    Attend Phys:    Codie Noriega     
Discharge:              Date of Birth:  [birth year]  
                                                          Report #: [id number]
                                                                    [id number]   
_______________________________________________________________________________
according to the CDC guidelines is 52 MMEs.
 
REVIEW OF SYSTEMS:  He is alert and oriented, answering all my questions
appropriately.  He reports no open lesions on his legs and the cellulitis has
resolved.  He does have a wound on his buttock per his report.  He is upbeat
today and his answering of questions.  Speech is clear with his Parkinson's.
 
IMPRESSION:
1.  History of Parkinson's disease.
2.  Chronic lymphedema in his lower extremities.
3.  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
4.  Complicated medications utilizing scheduled opioid medications.
5.  Sacral wound with home health care.
 
PLAN:
1.  We discussed treatment options via the telephone today for his Telemed
appointment.  He would like to continue his oxycodone that he takes for
breakthrough pain medicines.  He believes he does not need his muscle relaxant
filled today.  Scripts will be sent electronically by Dr. Archer to fill on
04/02/2021 of his oxycodone 10 mg and oxycodone 5 mg.
2.  The patient and sister informed that they will need to come to an in-person
visit in early May.  We discussed that he needs a stretcher possibly to come to
this appointment.  I encouraged them to reach out to the primary care office for
resources for transportation of stretchers or discuss this with her home health
nurse that comes on a regular basis.  I explained that  we would need to
see him or we will no longer be writing any further medications without actual
face-to-face physical examination and they verbalized understanding since our
last face to face visit in the office was April 2020. He has been at home due
to Covid-19 and his comorbidities.
3.  The patient is seen today in collaboration with Dr. PRASANTH Archer.
 
Time spent in consultation reviewing clinical notes, physician reports for the
Telemed appointment of 15 minutes.  Time spent in preparation for appointment
reviewing, prescription monitoring system, review of previous records and
treatment options and reviewing current medications of 8 minutes.
 
Time spent preparing and sending electronic prescriptions with collaborating
physician, Dr. Archer and time spent documentation of visit and plan of treatment
of 5 minutes.
 
Total time spent 28 minutes.
 
 
 
  <ELECTRONICALLY SIGNED>
   By: Codie Noriega             
  03/15/21     0756
D: 03/12/21 1121                           _____________________________________
T: 03/12/21 1213                           Codie Noriega               /nt

## 2021-03-18 ENCOUNTER — HOSPITAL ENCOUNTER (INPATIENT)
Dept: HOSPITAL 35 - ER | Age: 61
LOS: 8 days | Discharge: NURSING, REHAB OR LONG-TERM CARE | DRG: 593 | End: 2021-03-26
Attending: HOSPITALIST | Admitting: HOSPITALIST
Payer: COMMERCIAL

## 2021-03-18 VITALS — HEIGHT: 70 IN | WEIGHT: 295.18 LBS | BODY MASS INDEX: 42.26 KG/M2

## 2021-03-18 VITALS — SYSTOLIC BLOOD PRESSURE: 107 MMHG | DIASTOLIC BLOOD PRESSURE: 73 MMHG

## 2021-03-18 VITALS — DIASTOLIC BLOOD PRESSURE: 78 MMHG | SYSTOLIC BLOOD PRESSURE: 121 MMHG

## 2021-03-18 DIAGNOSIS — Z96.642: ICD-10-CM

## 2021-03-18 DIAGNOSIS — Z79.899: ICD-10-CM

## 2021-03-18 DIAGNOSIS — Z80.1: ICD-10-CM

## 2021-03-18 DIAGNOSIS — K21.9: ICD-10-CM

## 2021-03-18 DIAGNOSIS — Z95.820: ICD-10-CM

## 2021-03-18 DIAGNOSIS — L89.313: ICD-10-CM

## 2021-03-18 DIAGNOSIS — N39.0: ICD-10-CM

## 2021-03-18 DIAGNOSIS — Z86.14: ICD-10-CM

## 2021-03-18 DIAGNOSIS — E44.0: ICD-10-CM

## 2021-03-18 DIAGNOSIS — I50.9: ICD-10-CM

## 2021-03-18 DIAGNOSIS — Z20.822: ICD-10-CM

## 2021-03-18 DIAGNOSIS — E11.51: ICD-10-CM

## 2021-03-18 DIAGNOSIS — G20: ICD-10-CM

## 2021-03-18 DIAGNOSIS — G89.29: ICD-10-CM

## 2021-03-18 DIAGNOSIS — L89.153: Primary | ICD-10-CM

## 2021-03-18 DIAGNOSIS — Z88.6: ICD-10-CM

## 2021-03-18 DIAGNOSIS — L89.323: ICD-10-CM

## 2021-03-18 DIAGNOSIS — R53.81: ICD-10-CM

## 2021-03-18 LAB
ALBUMIN SERPL-MCNC: 2.7 G/DL (ref 3.4–5)
ALT SERPL-CCNC: 8 U/L (ref 16–63)
ANION GAP SERPL CALC-SCNC: 5 MMOL/L (ref 7–16)
AST SERPL-CCNC: 11 U/L (ref 15–37)
BASOPHILS NFR BLD AUTO: 0.8 % (ref 0–2)
BILIRUB DIRECT SERPL-MCNC: < 0.1 MG/DL
BILIRUB SERPL-MCNC: 0.4 MG/DL (ref 0.2–1)
BILIRUB UR-MCNC: NEGATIVE MG/DL
BUN SERPL-MCNC: 7 MG/DL (ref 7–18)
CALCIUM OXALATE: (no result) /LPF
CALCIUM SERPL-MCNC: 8.5 MG/DL (ref 8.5–10.1)
CHLORIDE SERPL-SCNC: 100 MMOL/L (ref 98–107)
CO2 SERPL-SCNC: 31 MMOL/L (ref 21–32)
COLOR UR: YELLOW
CREAT SERPL-MCNC: 0.9 MG/DL (ref 0.7–1.3)
EOSINOPHIL NFR BLD: 3 % (ref 0–3)
ERYTHROCYTE [DISTWIDTH] IN BLOOD BY AUTOMATED COUNT: 14.9 % (ref 10.5–14.5)
GLUCOSE SERPL-MCNC: 108 MG/DL (ref 74–106)
GRANULOCYTES NFR BLD MANUAL: 64.1 % (ref 36–66)
HCT VFR BLD CALC: 37.4 % (ref 42–52)
HGB BLD-MCNC: 12.1 GM/DL (ref 14–18)
KETONES UR STRIP-MCNC: NEGATIVE MG/DL
LYMPHOCYTES NFR BLD AUTO: 20.8 % (ref 24–44)
MCH RBC QN AUTO: 27.7 PG (ref 26–34)
MCHC RBC AUTO-ENTMCNC: 32.3 G/DL (ref 28–37)
MCV RBC: 85.8 FL (ref 80–100)
MONOCYTES NFR BLD: 11.3 % (ref 1–8)
NEUTROPHILS # BLD: 3.8 THOU/UL (ref 1.4–8.2)
PLATELET # BLD: 293 THOU/UL (ref 150–400)
POTASSIUM SERPL-SCNC: 3.7 MMOL/L (ref 3.5–5.1)
PROT SERPL-MCNC: 7.5 G/DL (ref 6.4–8.2)
RBC # BLD AUTO: 4.36 MIL/UL (ref 4.5–6)
RBC # UR STRIP: (no result) /UL
SODIUM SERPL-SCNC: 136 MMOL/L (ref 136–145)
SP GR UR STRIP: 1.01 (ref 1–1.03)
URINE CLARITY: CLEAR
URINE GLUCOSE-RANDOM*: NEGATIVE
URINE LEUKOCYTES-REFLEX: (no result)
URINE NITRITE-REFLEX: NEGATIVE
URINE PROTEIN (DIPSTICK): (no result)
URINE WBC-REFLEX: (no result) /HPF (ref 0–5)
UROBILINOGEN UR STRIP-ACNC: 0.2 E.U./DL (ref 0.2–1)
WBC # BLD AUTO: 5.9 THOU/UL (ref 4–11)

## 2021-03-18 PROCEDURE — 27000 TENOTOMY ADDUCTOR HIP PERQ: CPT

## 2021-03-18 PROCEDURE — 10102: CPT

## 2021-03-18 PROCEDURE — 10195: CPT

## 2021-03-19 VITALS — SYSTOLIC BLOOD PRESSURE: 146 MMHG | DIASTOLIC BLOOD PRESSURE: 91 MMHG

## 2021-03-19 VITALS — SYSTOLIC BLOOD PRESSURE: 144 MMHG | DIASTOLIC BLOOD PRESSURE: 93 MMHG

## 2021-03-19 VITALS — SYSTOLIC BLOOD PRESSURE: 130 MMHG | DIASTOLIC BLOOD PRESSURE: 87 MMHG

## 2021-03-19 NOTE — NUR
ASSUMED PT CARE THIS AM. PT VSS, A&OX4. PT ABLE TO USE SOFT CALL LIGHT AND
MAKES NEEDS KNOWN. CONTRACTURES NOTED IN UPPER EXTREMETIES AND NECK. PAIN
REPORTED IN THE PATIENTS SACRAL AREA, GIVEN PAIN MEDS PER EMAR. TOOK ALL
MEDICATIONS WITHOUT ISSUE THIS MORNING. IV PATENT, MEDS INFUSING. CARPIO
CATHETER IN PLACE, DRAINING WELL. ON ROOM AIR. PATIENT REMAINS IN BED, FALL
PRECAUTIONS IN PLACE. WOUND TO SACRAL AREA AND LOWER EXTREMETIES WRAPPED FROM
NIGHT NURSE.

## 2021-03-19 NOTE — NUR
CM COMPLETED THE INITIAL ASSESSMENT TO DISCUSS DC PLAN AND HOME SITUATION. PT
LIVES HOME WITH MOTHER AND SISTER. PT STATED HE HAS 5 SISTER THT HELP WITH
CARES. PT STTED HE IS CURRENT W[facility] AND HAS BEEN WITH THEM FOR "A LONG
TIME." PT HAS HX W/[facility], BUT DOES NOT WANT TO RTRN THERE B/C HE
FELT "UNSAFE" D/T STAFF NOT RESPONDING TO CALL LIGHT AT NIGHT. PT DOES NOT
AMBULATE. PT HAS W/C AND ELECTRIC W/C. PT IS PENDING THERAPY EVALS. CM TO CONT
TO FOLLOW

## 2021-03-19 NOTE — NUR
pt sister, siddharth, arrived to [facility] and informed cm she wants pt to go to snf
for wound care. cm provided a list. siddharth stated she needed to discuss
w/sister who is a sw, before making a decision on a facility.

## 2021-03-19 NOTE — NUR
ADMISSION ASSESSMENT COMPLTED. PT IS ALERT AND ORIENTED WITH SOME ANXIETY. PT
WOUND PICS TAKEN. IVF STARTED. PAIN MEDS GIVEN FOR PAIN IN SACCRUM AND BLE.
AFEBRILE.TOAL CARE. SWALLOWS MEDS OK. ROOM AIR, NO SOA OR COUGH. SOFT TOUCH
CALL LIGHT WITHIN REACH.

## 2021-03-20 VITALS — DIASTOLIC BLOOD PRESSURE: 71 MMHG | SYSTOLIC BLOOD PRESSURE: 130 MMHG

## 2021-03-20 VITALS — DIASTOLIC BLOOD PRESSURE: 74 MMHG | SYSTOLIC BLOOD PRESSURE: 125 MMHG

## 2021-03-20 VITALS — DIASTOLIC BLOOD PRESSURE: 69 MMHG | SYSTOLIC BLOOD PRESSURE: 121 MMHG

## 2021-03-20 VITALS — SYSTOLIC BLOOD PRESSURE: 120 MMHG | DIASTOLIC BLOOD PRESSURE: 84 MMHG

## 2021-03-20 VITALS — SYSTOLIC BLOOD PRESSURE: 117 MMHG | DIASTOLIC BLOOD PRESSURE: 71 MMHG

## 2021-03-20 NOTE — NUR
ASSESSMENT COMPLETED. PT IS ALERT AND ORIENTED. GETS ANXIOUS AT TIMES.DRSG TO
SACCRAL WOUND COMPLETED. VSS. PT IS ON ROOM AIR-SATTING OK.PT MAKES NEEDS
KNOWN.

## 2021-03-20 NOTE — NUR
ASSUMED PT CARE AROUND 0715. PT ALERT X ORIENTED X4. BEDREST, ON ROOM AIR. Q X
2 TURN. PT COOPERATIVE. LBM ON 3/18/21. IV RT AC/NS RUNNING AT 100ML/HR.
SACRAL WOUND DRESSING DONE. FALL PREACAUTION IN PLACE. PT'S SISTER VISITED
HIME TODAY EVENING. HOURLY ROUNDING DONE. SHIFT REPORT GIVEN TO SIDDHARTH LORD.

## 2021-03-21 VITALS — DIASTOLIC BLOOD PRESSURE: 85 MMHG | SYSTOLIC BLOOD PRESSURE: 127 MMHG

## 2021-03-21 VITALS — SYSTOLIC BLOOD PRESSURE: 135 MMHG | DIASTOLIC BLOOD PRESSURE: 92 MMHG

## 2021-03-21 VITALS — DIASTOLIC BLOOD PRESSURE: 92 MMHG | SYSTOLIC BLOOD PRESSURE: 124 MMHG

## 2021-03-21 NOTE — NUR
ASSUMED PT CARE AROUND 0730. PT ALERT X ORIENTED X4. ON ROOM AIR. Q X 2 XTURN.
IV RT AC SALINE LOCKED. SLIGHT WHEEZING,RN LET  KNOW AND HE DC'D
FLUIDS INFUSING. UNIT SECRETARY SAID, HE HAD AN EPISODE OF SEIZURE
AROUND 1300, CHARGE NURSE WITNESSED AND THEY SAID HE WAS FINE IN A FRACTION OF
SECOND. PT SAID THIS HAPPENS SOMETIMES AND HE EXACTLY DOESN'T KNOW WHEN THE
SEIZURE HAPPENED LAST TIME. RN TALKED TO PT'S SISTER ABOUT SEIZURE, SISTER
SAID IT IS SOME THING NEW. RN LET  KNOW. PT EATING AND DRINKING WELL.
DRESSING CHANGE DONE BY RN. FALL PREACUTION IN PLACE. SHIFT REPORT GIVEN TO
HAO LORD.

## 2021-03-21 NOTE — NUR
PT ALERT AND ORIENTED. GOOD APPETITE. DRINKS FREQUENTLY. CARPIO TO D/D WITH
DARK YELLOW URINE.Q2HR TURS. PAIN TO LEGS, GIVEN OXYCODONE. LYMPHEDEMA WRAPS
IN PLACE. NO FURTHER CONCERNS.

## 2021-03-22 VITALS — SYSTOLIC BLOOD PRESSURE: 138 MMHG | DIASTOLIC BLOOD PRESSURE: 75 MMHG

## 2021-03-22 VITALS — DIASTOLIC BLOOD PRESSURE: 84 MMHG | SYSTOLIC BLOOD PRESSURE: 130 MMHG

## 2021-03-22 VITALS — SYSTOLIC BLOOD PRESSURE: 115 MMHG | DIASTOLIC BLOOD PRESSURE: 71 MMHG

## 2021-03-22 NOTE — NUR
PT AOX4. PT REPORTS PAIN IN SACRUM AND BLE. PT RECEIVING PRN PO OXYCODONE
Q6HR. PT DENIES SOB WHILE ON ROOM AIR, AUDIBLE WHEEZES NOTED. PT TOLERATING PO
INTAKE OF FLUIDS AND REGULAR DIET WITHOUT ISSUE. PT WITHOUT NAUSEA OR EMESIS.
PT INCONTINENT OF BOWEL, VOIDING PER CARPIO CATHETER. CATHETER PATENT AND
FUNCTIONING PROPERLY. PT RESTING IN BED THROUGHOUT OUT SHIFT, FREQUENT
REPOSITIONING ENCOURAGED. PT REPORTS NUMBNESS IN RIGHT SHOULDER. CAPILLARY
REFILL INTACT IN ALL EXTREMITIES. TREMORS NOTED TO RUE. PT REFUSING WOUND
CARE. LYMPHEDEMA WRAPS TO BLE REMAIN INTACT, NO DRAINAGE NOTED. PT REQUESTING
TO REMOVE HEEL PROTECTORS TOWARDS END OF SHIFT, BLE ELEVATED.  PT SISTER,
MELBA, NOTIFIED UNIT, UPDATE PROVIDED, NO CONCERNS EXPRESSED.  PT ENCOURAGED
TO NOTIFY STAFF FOR ALL NEEDS, CALL LIGHT WITHIN REACH, BED ALARM ON, BED
LOCKED IN LOWEST POSITION, ROOM REMAINS NEAR NURSES STATION, FREQUENT
MONITORING WILL CONTINUE.

## 2021-03-22 NOTE — NUR
ON-GOING ASSESSMENT: CM REVIEWED CHART AND SPOKE WITH PATIENT TO SEE IF HE HAD
REVIEWED SNF LIST WITH HIS SISTER AND WHERE HE WANTED REFERRALS TO. HE STATES
TO CONTACT HIS SISTER MELBA AND SEND WHERE SHE REQUESTS. CM REACHED OUT TO
[facility] WHO REPORTS THEY WANT REFERRALS [facility] TO [facility]. CM FAXED REFERRALS. NIKOLAS LIASON FROM [facility] STATING THEY CANNOT
ACCEPT HIM DUE TO HIS WOUNDS RIGHT NOW. CM LEFT  WITH STELLA IN ADMISSIONS AT
THE [facility] AND AWAITING INPUT. CM NOTIFIED BEDSIDE RN TO COLLECT COVID TEST. PT
LIVES AT HOME WITH HIS MOTHER AND SISTER AND WAS IN SERVICE WITH [facility] .
DUE TO PATIENTS PARKINSONS THEY HAVE A LIFT DEVICE, WHEELCHAIR, HOSPITAL BED,
AND BEDSIDE COMMODE. CM AWAITING FURTHER INPUT FROM THE [facility] AT THIS TIME.

## 2021-03-23 VITALS — SYSTOLIC BLOOD PRESSURE: 141 MMHG | DIASTOLIC BLOOD PRESSURE: 85 MMHG

## 2021-03-23 VITALS — DIASTOLIC BLOOD PRESSURE: 64 MMHG | SYSTOLIC BLOOD PRESSURE: 126 MMHG

## 2021-03-23 VITALS — DIASTOLIC BLOOD PRESSURE: 88 MMHG | SYSTOLIC BLOOD PRESSURE: 135 MMHG

## 2021-03-23 VITALS — SYSTOLIC BLOOD PRESSURE: 142 MMHG | DIASTOLIC BLOOD PRESSURE: 69 MMHG

## 2021-03-23 NOTE — NUR
on-going assessment: GRACIELA SPOKE WITH MELBA PATIENTS SISTER WHO ALSO REQUESTED A
REFERRAL TO [facility]. GRACIELA FAXED REFERRAL AND SPOKE TO GANGA  IN ADMISSIONS
AND SHE STATED THEY WILL REVIEW AND CONTACT GRACIELA.
GRACIELA ALSO REACHED OUT TO [facility] AND LEFT ANOTHER  WITH GILMA TO SEE IF
THEY HAD RECEIVED.
GRACIELA ALSO SPOKE WITH ITZEL AT [facility] WHO REPORTS THEY ARE REVIEWING.
AWAITING AN ACCEPTING FACILITY AT THIS TIME.

## 2021-03-23 NOTE — NUR
RECEIVED CARE OF THIS PATIENT AT 1900.  PATIENT ALERT X4.  C/O PAIN, MED
GIVEN.  REMAINS ON BEDREST.  REFUSED DRESSING CHANGE ON SACRAL WOUND.  HAS
CARPIO.  BLI LOWER EXT IS WRAPPED. PATIENT IS TOTAL CARE AND A FEEDER.  SLEPT
OFF AND ON DURING NIGHT.

## 2021-03-23 NOTE — NUR
PT A&OX4, VSS, C/O PAIN. PATIENT REFUSES TURNS. WOUND CARE TO COCCYX
COMPLETED. LYMPH WRAPS BILAT LEGS REMAINS C/D/I. PATIENT SISTER AT BEDSIDE.
NO SIGNS OF DISTRESS. AWAITING FACILITY PLACEMENT, WILL CONTINUE TO MONITOR.

## 2021-03-23 NOTE — NUR
ON-GOING ASSESSMENT: GRACIELA REVIEWED CHART. CM SPOKE WITH LIASON FROM [facility] AND THEY HAVE ALSO DECLINED PATIENT DUE TO MEDICAL COMPLEXITY. GRACIELA
CONTACTED PTS SISTER MELBA THIS AM TO NOTIFY HER AND REQUEST OTHER SNF
OPTIONS. SHE STATES SHE IS GOING TO REVIEW THE LIST, TALK WITH PT AND FAMILY,
AND CONTACT CM BACK. SHE ALSO REQUEST TO SPEAK TO PHYSICIAN ABOUT MEDICAL
CARE. CM NOTIFIED ATTENDING.

## 2021-03-24 VITALS — SYSTOLIC BLOOD PRESSURE: 133 MMHG | DIASTOLIC BLOOD PRESSURE: 69 MMHG

## 2021-03-24 VITALS — DIASTOLIC BLOOD PRESSURE: 96 MMHG | SYSTOLIC BLOOD PRESSURE: 141 MMHG

## 2021-03-24 VITALS — DIASTOLIC BLOOD PRESSURE: 69 MMHG | SYSTOLIC BLOOD PRESSURE: 152 MMHG

## 2021-03-24 VITALS — SYSTOLIC BLOOD PRESSURE: 115 MMHG | DIASTOLIC BLOOD PRESSURE: 76 MMHG

## 2021-03-24 PROCEDURE — 05HY33Z INSERTION OF INFUSION DEVICE INTO UPPER VEIN, PERCUTANEOUS APPROACH: ICD-10-PCS | Performed by: HOSPITALIST

## 2021-03-24 NOTE — NUR
PT CARE ASSUMED AT 0700. A&Ox4. PT REFUSED DRESSING CHANGE TODAY DUE TO HIS
SISTER BEING HERE. PICC LINE PLACED FOR LONGTERM ANTIBIOTIC TREATMENT AFTER
DISCHARGE. BEDREST. CARPIO IN PLACE. FALL PROTOCOL IN PLACE. LOW [facility] MATRESS
IN PLACE.

## 2021-03-24 NOTE — NUR
ON-GOING ASSESSMENT: CM REVIEWED CHART. CM RECEIVED A CALL BACK FROM [facility] MIRANDA STATING THEY CANNOT ACCEPT PATIENT DUE TO COMPLEXITY. CM SPOKE
WITH RUTH SCHULER FROM [facility] WHO ALSO DECLINED PATIENT DUE TO MEDICAL
COMPLEXITY. TRISTEN PACKER DECLINED DUE TO MEDICAL COMPLEXITY. CM SPOKE WITH
PATIENT AS WELL AS HIS SISTER MELBA ABOUT POSSIBLE OTHER OPTIONS. PTS SITSER
REQUEST REFERRAL TO BE SENT TO [facility] [place] [facility]. CM
FAXECD REFERRAL AND AWAITING INPUT AT THIS TIME.

## 2021-03-24 NOTE — NUR
RECIEVED CARE OF THIS PATIENT AT 1900.  PATIENT ALERT AND ORIENTED X4.
CONTINIES TO BE ON BEDREST.  JEAN LOWER EXT WRAPPED.  REFUSED SACRAL DRESSING
CHANGE.  C/O PAIN, MED GIVEN.  HAS CARPIO.  TAKES EXTRA TIME.  SLEPT VERY
LITTLE THIS SHIFT.

## 2021-03-25 VITALS — SYSTOLIC BLOOD PRESSURE: 136 MMHG | DIASTOLIC BLOOD PRESSURE: 102 MMHG

## 2021-03-25 VITALS — SYSTOLIC BLOOD PRESSURE: 135 MMHG | DIASTOLIC BLOOD PRESSURE: 81 MMHG

## 2021-03-25 VITALS — DIASTOLIC BLOOD PRESSURE: 61 MMHG | SYSTOLIC BLOOD PRESSURE: 131 MMHG

## 2021-03-25 NOTE — NUR
ASSUMED PT CARE THIS AM. PT VSS, A&OX4. PATIENT MEDICATED FOR PAIN IN SACRUM
PRIOR TO SHIFT CHANGE, MANAGED WELL WITH MEDS GIVEN. LYPHEDEMA WRAPS TO
BILATERAL LOWER EXTREMETIES IN PLACE C/D/I. CARPIO CATHETER IN PLACE DRAINING
WELL. PATIENT CONTRACTED IN UPPER EXTREMETIES. ON ROOM AIR. FALL PRECAUTIONS
ARE IN PLACE. PATIENT USING A SOFT PUSH CALL LIGHT WHEN NEEDED. TOOK MEDS
WITHOUT ISSUE THIS AM. PATIENT REMAINS IN BED. IV SALINE LOCKED. HYDRATION
ENCOURAGED.

## 2021-03-25 NOTE — NUR
ASSESSED AT START OF SHIFT, PT RESTING IN BED. EVENING MEDS GIVEN AND PT HEIDI
IT WELL. REPOSITIONED FOR COMFORT. SACRAL WOUND DRESSING CHANGED AND BARRIER
CREAM APPLIED. FOLLEY CATH INTACT AND PATENT. PICC LINE IN LEFT UA ABX
INFUSING. HYDROCODONE GIVEN FOR PAIN. FALL PREC IN PLACE AND CALL LIGHT AT
REACH WILL CONT TO MONITOR.

## 2021-03-25 NOTE — NUR
on-going assessment: CM RECEIVED CALL FROM CALDERON AT [facility] WHO
REPORTS THEY CANNOT ACCEPT PT. CHRISTINA ALSO DECLINED STATING THEY CANNOT
ACCEPT PATIENT UNLESS THEY ARE 62 YEARS OR OLDER. CM DISCUSSED WITH PATIENTS
SISTER MELBA ABOUT OTHER OPTIONS. REFERRAL WAS SENT TO [facility] AND CM NOTIFIED LIASON. AWAITING INPUT FROM THEM. SISTER STATES SHE
WILL ALSO REVIEW SNF LIST AND LOOK FOR POSSIBLE OTHER OPTIONS THEY ARE
INTERSTED IN. CM WILL CONTINUE TO FOLLOW.

## 2021-03-26 VITALS — DIASTOLIC BLOOD PRESSURE: 102 MMHG | SYSTOLIC BLOOD PRESSURE: 144 MMHG

## 2021-03-26 VITALS — DIASTOLIC BLOOD PRESSURE: 78 MMHG | SYSTOLIC BLOOD PRESSURE: 149 MMHG

## 2021-03-26 VITALS — DIASTOLIC BLOOD PRESSURE: 86 MMHG | SYSTOLIC BLOOD PRESSURE: 158 MMHG

## 2021-03-26 NOTE — NUR
Assumed pt care this am. Pt is alert & oriented x4 but forgetful at times. Pt
is max assist and a feeder. Pt has sacral wound and refused to reposition. Pt
has UE contractures and bilateral chronic lymphedema. Pt has frederick cath on.
Given pt antibiotic during shift. Given report to nurse at [facility]. Plan is to
be  by 2pm. Pt on the bed watching tv, bed on the lowest position, side
rails up, call light within reach. Will continue to monitor.

## 2021-03-26 NOTE — NUR
PT AOX4 WITH INTERMITTENT FORGETFULNESS. PT REPORTS 6/10 PAIN IN BLE. PT
RECEIVING PRN PO OXYCODONE Q6HR. PT DENIES SOB WHILE ON ROOM AIR. PT NOTED TO
HAVE AUDIBLE WHEEZES WITH RESPIRATIONS AND INTERMITTENT CONGESTED COUGHING
WITH THICK EXPECTORANT. PT TOLERATING PO INTAKE OF FLUIDS AND REGULAR DIET
WITHOUT ISSUE. PT WITHOUT NAUSEA OR EMESIS. PT RESTING IN BED THROUGHOUT
SHIFT, FREQUENT REPOSITIONING ENCOURAGED. PT REFUSING REPOSITIONING
ASSISTANCE, LOW AIR LOSS MATTRESS REMAINS IN PLACE. PT REFUSING WOUND CARE,
LYMPHEDEMA WRAPS TO BLE CLEAN, DRY AND INTACT. PT DENIES NUMBNESS AND TINGLING
IN ALL EXTREMITIES. TREMORS TO RUE OBSERVED. CAPILLARY REFILL LESS THAN 3SEC
IN ALL EXTREMITIES. PT ENCOURAGED TO NOTIFY STAFF FOR ALL NEEDS, CALL LIGHT
WITHIN REACH, BED ALARM ON, ROOM REMAINS NEAR NURSES STATION, BED LOCKED IN
LOWEST POSITION, FREQUENT MONITORING WILL CONTINUE.

## 2021-03-26 NOTE — NUR
ON-GOING ASSESSMENT: CM REVIEWED CHART AND SPOKE WITH PATIENT AND HIS SISTER
MELBA. PT AND SISTER ARE ARGREEABLE TO DISCHARGE TODAY TO [facility]
LOCATION. CM SPOKE WITH MAIRA MIRANDA AT [facility] WHO REPORTS THEY CAN
ACCEPT PT TODAY AND WITH HIS IV ANBX. CHART COPY WAS ORDERED. CM FAXED
NEGATIVE COVID TEST ALONG WITH DISCHARGE ORDERS TO THE FACILITY AND CONFIRMED
THEY RECEIVED IT. TRANSPORTATION WAS ARRANGED VIA [facility] [phone] AND SET UP
FOR BETWEEN [phone]. BEDSIDE RN WAS NOTIFIED AS WELL AS PT AND HIS SISTER.
CM PROVIDED BEDSIDE RN WITH THE NUMBER FOR REPORT. PT REPORTS NO FURTHER NEEDS
FROM CM AT THIS TIME.
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
## 2021-05-07 ENCOUNTER — HOSPITAL ENCOUNTER (OUTPATIENT)
Dept: HOSPITAL 35 - TELEPC | Age: 61
End: 2021-05-07
Attending: CLINICAL NURSE SPECIALIST
Payer: COMMERCIAL

## 2021-05-07 DIAGNOSIS — G25.9: ICD-10-CM

## 2021-05-07 DIAGNOSIS — Z88.8: ICD-10-CM

## 2021-05-07 DIAGNOSIS — F11.20: ICD-10-CM

## 2021-05-07 DIAGNOSIS — Z76.0: Primary | ICD-10-CM

## 2021-05-07 DIAGNOSIS — Z79.899: ICD-10-CM

## 2021-05-07 DIAGNOSIS — I89.0: ICD-10-CM

## 2021-05-07 DIAGNOSIS — G20: ICD-10-CM

## 2021-06-11 ENCOUNTER — HOSPITAL ENCOUNTER (INPATIENT)
Dept: HOSPITAL 35 - ER | Age: 61
LOS: 7 days | Discharge: HOME HEALTH | DRG: 592 | End: 2021-06-18
Attending: INTERNAL MEDICINE | Admitting: INTERNAL MEDICINE
Payer: COMMERCIAL

## 2021-06-11 VITALS — SYSTOLIC BLOOD PRESSURE: 110 MMHG | DIASTOLIC BLOOD PRESSURE: 74 MMHG

## 2021-06-11 VITALS — HEIGHT: 70 IN | BODY MASS INDEX: 39.65 KG/M2 | WEIGHT: 277 LBS

## 2021-06-11 VITALS — DIASTOLIC BLOOD PRESSURE: 67 MMHG | SYSTOLIC BLOOD PRESSURE: 94 MMHG

## 2021-06-11 VITALS — SYSTOLIC BLOOD PRESSURE: 137 MMHG | DIASTOLIC BLOOD PRESSURE: 82 MMHG

## 2021-06-11 VITALS — SYSTOLIC BLOOD PRESSURE: 115 MMHG | DIASTOLIC BLOOD PRESSURE: 72 MMHG
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
## 2021-06-11 DIAGNOSIS — T83.511A: ICD-10-CM

## 2021-06-11 DIAGNOSIS — R53.81: ICD-10-CM

## 2021-06-11 DIAGNOSIS — E43: ICD-10-CM

## 2021-06-11 DIAGNOSIS — J18.9: ICD-10-CM

## 2021-06-11 DIAGNOSIS — K21.9: ICD-10-CM

## 2021-06-11 DIAGNOSIS — S31.809A: ICD-10-CM

## 2021-06-11 DIAGNOSIS — I50.9: ICD-10-CM

## 2021-06-11 DIAGNOSIS — Z86.14: ICD-10-CM

## 2021-06-11 DIAGNOSIS — G83.9: ICD-10-CM

## 2021-06-11 DIAGNOSIS — G93.40: ICD-10-CM

## 2021-06-11 DIAGNOSIS — E66.9: ICD-10-CM

## 2021-06-11 DIAGNOSIS — N39.0: ICD-10-CM

## 2021-06-11 DIAGNOSIS — L03.317: ICD-10-CM

## 2021-06-11 DIAGNOSIS — Z88.6: ICD-10-CM
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
## 2021-06-11 DIAGNOSIS — Z95.820: ICD-10-CM

## 2021-06-11 DIAGNOSIS — L89.322: ICD-10-CM

## 2021-06-11 DIAGNOSIS — Z87.81: ICD-10-CM

## 2021-06-11 DIAGNOSIS — I42.9: ICD-10-CM

## 2021-06-11 DIAGNOSIS — L89.312: Primary | ICD-10-CM

## 2021-06-11 DIAGNOSIS — G89.29: ICD-10-CM

## 2021-06-11 DIAGNOSIS — I73.9: ICD-10-CM

## 2021-06-11 DIAGNOSIS — Z20.822: ICD-10-CM
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
## 2021-06-11 DIAGNOSIS — N31.9: ICD-10-CM

## 2021-06-11 DIAGNOSIS — I89.0: ICD-10-CM

## 2021-06-11 DIAGNOSIS — G20: ICD-10-CM

## 2021-06-11 DIAGNOSIS — Z96.642: ICD-10-CM

## 2021-06-11 LAB
ALBUMIN SERPL-MCNC: 2.6 G/DL (ref 3.4–5)
ALT SERPL-CCNC: 15 U/L (ref 30–65)
ANION GAP SERPL CALC-SCNC: 12 MMOL/L (ref 7–16)
AST SERPL-CCNC: 14 U/L (ref 15–37)
BASOPHILS NFR BLD AUTO: 1.2 % (ref 0–2)
BILIRUB SERPL-MCNC: 0.4 MG/DL (ref 0.2–1)
BILIRUB UR-MCNC: NEGATIVE MG/DL
BUN SERPL-MCNC: 7 MG/DL (ref 7–18)
CALCIUM OXALATE: (no result) /LPF
CALCIUM SERPL-MCNC: 8.6 MG/DL (ref 8.5–10.1)
CHLORIDE SERPL-SCNC: 103 MMOL/L (ref 98–107)
CO2 SERPL-SCNC: 25 MMOL/L (ref 21–32)
COLOR UR: YELLOW
CREAT SERPL-MCNC: 0.9 MG/DL (ref 0.7–1.3)
EOSINOPHIL NFR BLD: 4.7 % (ref 0–3)
ERYTHROCYTE [DISTWIDTH] IN BLOOD BY AUTOMATED COUNT: 16 % (ref 10.5–14.5)
GLUCOSE SERPL-MCNC: 99 MG/DL (ref 74–106)
GRANULOCYTES NFR BLD MANUAL: 60.5 % (ref 36–66)
HCT VFR BLD CALC: 38.5 % (ref 42–52)
HGB BLD-MCNC: 12.4 GM/DL (ref 14–18)
KETONES UR STRIP-MCNC: NEGATIVE MG/DL
LIPASE: 166 U/L (ref 73–393)
LYMPHOCYTES NFR BLD AUTO: 21.5 % (ref 24–44)
MCH RBC QN AUTO: 27.2 PG (ref 26–34)
MCHC RBC AUTO-ENTMCNC: 32.3 G/DL (ref 28–37)
MCV RBC: 84.2 FL (ref 80–100)
MONOCYTES NFR BLD: 12.1 % (ref 1–8)
MUCUS: (no result) STRN/LPF
NEUTROPHILS # BLD: 3.5 THOU/UL (ref 1.4–8.2)
PLATELET # BLD: 306 THOU/UL (ref 150–400)
POTASSIUM SERPL-SCNC: 4 MMOL/L (ref 3.5–5.1)
PROT SERPL-MCNC: 7.9 G/DL (ref 6.4–8.2)
RBC # BLD AUTO: 4.57 MIL/UL (ref 4.5–6)
RBC # UR STRIP: NEGATIVE /UL
SODIUM SERPL-SCNC: 140 MMOL/L (ref 136–145)
SP GR UR STRIP: 1.02 (ref 1–1.03)
SQUAMOUS: (no result) /LPF (ref 0–3)
URINE CLARITY: CLEAR
URINE GLUCOSE-RANDOM*: NEGATIVE
URINE LEUKOCYTES-REFLEX: (no result)
URINE NITRITE-REFLEX: POSITIVE
URINE PROTEIN (DIPSTICK): NEGATIVE
URINE WBC-REFLEX: (no result) /HPF (ref 0–5)
UROBILINOGEN UR STRIP-ACNC: 0.2 E.U./DL (ref 0.2–1)
WBC # BLD AUTO: 5.8 THOU/UL (ref 4–11)

## 2021-06-11 PROCEDURE — 10047: CPT

## 2021-06-11 PROCEDURE — 10045: CPT

## 2021-06-11 NOTE — NUR
PT RRIVED ON THE UNIT AROUND 1540 FROM ED. PT ALERT X ORIENTED X 4. ON ROOM
AIR. IV RT FOREARM. ON REGULAR DIET. PATIENT NOTED PAIN OF 5-6, HYDROCODONE
GIVEN FOR PAIN. PAIN PARTIALLY CONTROLLED, REASSESMENT NOTIFIED A PAIN OF
4.HAD BM 0N 06/09/21. ADMISSION HIST, EDUCATION AND ASSESMENTS COMPLETED. RN
SPOKE TO PT'S SISTER. PT HAS PITTING EDEMA ON BOTH LOWER EXTREMITES AND IS
WRAPPED FROM ER, PT REFUSED TO UNWRAP, SO PICTURES NOT TAKEN. WOUND ON THE
BACK AND SACRAL AREA, PICTURES TAKEN, NOT MEASURED AS THE WOUNDS ARE
UNMEASURABLE. WOUND CARE CONSULTED. PT IS TOTAL CARE AND SETUP FOR FEEDING.
FALL PRECAUTION IN PLACE. CALL LIGHT IN YANIRA. HOURLY ROUNDS DONE. SHIFT REPORT
GIVEN TO NIGHT SHIFT.

## 2021-06-11 NOTE — EMS
[place] [id number]                     EMS Patient Care Report       
_______________________________________________________________________________
 
Name:       VIOLA PARR                Room #:         463-P       ADM [place]  
M.R.#:      [id number]                       Account #:      [id number]  
Admission:  21    Attend Phys:    Sree Batista
Discharge:              Date of Birth:  [birth year]  
                                                          Report #: [id number]
                                                                    [id number]
_______________________________________________________________________________
THIS REPORT FOR:   //name//                          
 
Report Transmitted: 2021 22:35
EMS Care Summary
[place], [place]/[facility]
Incident [id number] @ 2021 09:32
 
Incident Location
[address] [place] [address]
 
Patient
VIOLA PARR
Male, 60 Years
 1960
 
Patient Address
[address] [place] [address]
 
Patient History
Parkinson's Disease,Gastro-Esophageal Reflux Disease (GERD),Urinary Tract 
Infection (UTI),Sepsis,Pressure Ulcer,Restless Leg Syndrome,Enlarged prostate, 
 
Patient Allergies
Morphine,
 
Patient Medications
Sinemet, Pantoprazole, Famotidine, Tamsulosin, Ropinirole, Furosemide, Zinc, 
Potassium, Oxycodone, 
 
Chief Complaint
Kidney pain, and productive cough
 
Disposition
Transported No Lights/[place]
 
Dispatch Reason
Sick Person
 
Transported To
[facility]
 
Narrative
M528 responded to a Sick. Upon arrival, family directs us downstairs to pt's 
location. 
 
 
 
[place] [address]                     EMS Patient Care Report       
_______________________________________________________________________________
 
Name:       VIOLA PARR                Room #:         463-P       ADM [place]  
M.R.#:      [id number]                       Account #:      [id number]  
Admission:  21    Attend Phys:    Sree Batista
Discharge:              Date of Birth:  60  
                                                          Report #: [id number]
                                                                    [id number]
_______________________________________________________________________________
 
Upon arrival, pt found GOLDEN and lying in bed. Pt is bedbound with advanced 
Parkinson's Dz. Over the last 2-3 days, he reports coughing up yellow phlegm, 
and having increasing lower abdominal/kidney pain similar to when he's got a 
UTI. Requesting EMS transport to [facility]. 
 
Contacted dispatch for manpower/lift help to move pt from basement and up some 
stairs to awaiting cot out back of house. P36 arrived then made our way to pt's 
bedside. Log rolled pt onto [address] , then carried out to cot. Secured on via 
straps, rails up, and moved to back of ambulance. Primary ALS assessment 
performed, vitals established, then transport to  initiated. 
 
Contacted  with a 3-5 miute ETA and report given. Pt remains stable 
throughout transport. Arrive at  and pt to ER 04. Report to RN then care 
released. 
 
Initial Vitals
@10:00P: 85,R: 20,BP: 121/92,Pain: 6/10,GCS: 15,CO: 0,SpO2: 96,Revised Trauma: 
12, 
 
Assessments
@10:00MENTAL:Person Oriented,Time Oriented,Event Oriented,Place 
Oriented,SKIN:HEENT:LUNG SOUNDS:ABDOMEN:PELVIS//GI:Pelvis 
GUOther,EXTREMITIES:PULSE:NEURO: 
 
Impression
Urinary Tract Infection (UTI)
 
Procedures
@10:00ALS AssessmentResponse: UnchangedSucceeded
 
Timeline
09:31,Call Received
:,Dispatch Notified
09:32,Dispatched
09:33,En Route
09:41,On Scene
09:42,At Patient
10:00,BP: 121/92 M,PULSE: 85,RR: 20 R,SPO2: 96 Ox,ETCO2:  ,BG: ,PAIN: 6,GCS: 15,
10:00,ALS Assessment,Response: UnchangedSucceeded,
10:02,Depart Scene
10:12,At Destination
10:27,Call Closed
 
Disclaimer
v1.1     Copyright  [email or web address]
 
 
 
[facility]
[address]
[place]   [address]                     EMS Patient Care Report       
_______________________________________________________________________________
 
Name:       VIOLA PARR ECTOR                Room #:         463-P       ADM [place]  
[facility].#:      [id number]                       Account #:      [id number]  
Admission:  21    Attend Phys:    Sree Batista
Discharge:              Date of Birth:  60  
                                                          Report #: [id number]
                                                                    [id number]
_______________________________________________________________________________
This EMS Care Summary contains data elements from the applicable legal record 
(which may be displayed differently). It is designed to provide pertinent 
information for the following purposes: continuity of care, clinical quality, 
and state data reporting. The complete legal record is available to ED staff 
and administrators of the receiving hospital in [facility]'s Patient Tracker. All data 
is provided "as is."

## 2021-06-11 NOTE — EKG
[address] [place]  [address]
Phone:  [phone]                    ELECTROCARDIOGRAM REPORT      
_______________________________________________________________________________
 
Name:       VIOLA PARR                Room #:                     REG ER  
M.R.#:      [id number]     Account #:      [id number]  
Admission:  21    Attend Phys:                          
Discharge:              Date of Birth:  60  
                                                          Report #: [id number]
   [id number]
_______________________________________________________________________________
                         [facility] ED
                                       
Test Date:    2021               Test Time:    11:48:05
Pat Name:     VIOLA PARR             Department:   
Patient ID:   [id number]            Room:          
Gender:       M                        Technician:   karla
:          1960               Requested By: Latoya Trejo
Order Number: [id number] MD:   Wang Patrick
                                 Measurements
Intervals                              Axis          
Rate:         84                       P:            32
OK:           182                      QRS:          49
QRSD:         79                       T:            22
QT:           364                                    
QTc:          431                                    
                           Interpretive Statements
Sinus rhythm
Compared to ECG 2018 14:13:30
Ventricular premature complex(es) no longer present
Electronically Signed On 2021 12:18:18 CDT by Wang Patrick
[email or web address]
 
 
 
 
 
 
 
 
 
 
 
 
 
 
 
 
 
 
 
 
 
 
  <ELECTRONICALLY SIGNED>
   By: Wang Patrick MD, [facility]    
  21     1218
D: 21 1148                           _____________________________________
T: 21 1148                           Wang Patrick MD, FACC      /EPI

## 2021-06-12 VITALS — SYSTOLIC BLOOD PRESSURE: 116 MMHG | DIASTOLIC BLOOD PRESSURE: 66 MMHG

## 2021-06-12 VITALS — SYSTOLIC BLOOD PRESSURE: 117 MMHG | DIASTOLIC BLOOD PRESSURE: 75 MMHG

## 2021-06-12 VITALS — SYSTOLIC BLOOD PRESSURE: 126 MMHG | DIASTOLIC BLOOD PRESSURE: 85 MMHG

## 2021-06-12 NOTE — NUR
Received awake on bed. Due medications given as prescribed, able to swallow
mweds w/o difficulty. On room air. Vital signs stable. On MS, not on
telemetry; no complains and signs of chest pain, crushing sensation and
heaviness. Assisted in ADLs. On regular diet- assisted in eating and drinking;
meal set up; no nausea, no vomiting and no abdominal pain noted. Incontinent
of bowel, checked regularly and changed as needed. With frederick in place;
draining well; output measured and recorded accordingly. With SL at R FA- on
IV antibiotics. Falls bundle in place.
With bilat leg wound dressing- pt seen and examined by Dr Pham this AM;
wounds on his back checked as well; wound photo taken; dressing changed; bed
bath given. Pt turned regularly on his sides; may refuse at times.
Complained of pain, due PRN pain meds given as prescribed. Verified with
pharmacist Deni re: pt's documented allergy to morphine- as per pharmacist,
med given on prev hospitalizations w/o adverse reactions; pt's sister Indigo
called and verified this, she said occassionally has headache when morphine
was given, unsure if it was caused by med- Dr Batista informed re: this;
adjusted PO pain meds; to continue Morphine.
Pt visited by his sister today.
To continue monitoring patient.

## 2021-06-12 NOTE — NUR
pain controlled this shift. patient encouraged fluids. patient skin is
dry, cleansed with soap and water and lotion applied. patient has raw
skin on the back and buttocks, no open areas note, redness noted on the
buttocks cheek.skin cleansed with soap and water.patient was encouraged to
turn q 2 hours but was not able to tolerate the turnings.  fall precaution in
place. patient in bed asleep at this time breathing regular and unlaboured.

## 2021-06-13 VITALS — SYSTOLIC BLOOD PRESSURE: 129 MMHG | DIASTOLIC BLOOD PRESSURE: 89 MMHG

## 2021-06-13 VITALS — SYSTOLIC BLOOD PRESSURE: 132 MMHG | DIASTOLIC BLOOD PRESSURE: 98 MMHG

## 2021-06-13 NOTE — NUR
pain controlled this shift. patient ble ace wraps are c/d/i. cleansed
entire back, buttocks and thigh with Ns, pat dried and applied barrier
cream, patient postioned this shift as he could torelate.urin is yellow
wihh no odor. patient encouraged fluids.fall precaution in place. patient in
bed asleep at this time breathing regular and unlaboured.
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
## 2021-06-13 NOTE — NUR
Assumed pt care this am, bed bath given and treatment of the back , buttocks
and posterior thigh given as per wound care orders. Pt is not able to
repositions self, requires 3 person assists. Compression wraps in place on
BLE, wound care scheduled for tomorrow. Pain is managed with medication, wants
more MD informed. Generalized edema is evident.  Pt is a total care, frequenbt
round done through out the  shift. POC followed.

## 2021-06-14 VITALS — SYSTOLIC BLOOD PRESSURE: 136 MMHG | DIASTOLIC BLOOD PRESSURE: 95 MMHG

## 2021-06-14 VITALS — SYSTOLIC BLOOD PRESSURE: 142 MMHG | DIASTOLIC BLOOD PRESSURE: 82 MMHG

## 2021-06-14 VITALS — DIASTOLIC BLOOD PRESSURE: 78 MMHG | SYSTOLIC BLOOD PRESSURE: 118 MMHG

## 2021-06-14 LAB
ANION GAP SERPL CALC-SCNC: 9 MMOL/L (ref 7–16)
BUN SERPL-MCNC: 10 MG/DL (ref 7–18)
CALCIUM SERPL-MCNC: 8.1 MG/DL (ref 8.5–10.1)
CHLORIDE SERPL-SCNC: 104 MMOL/L (ref 98–107)
CO2 SERPL-SCNC: 27 MMOL/L (ref 21–32)
CREAT SERPL-MCNC: 0.8 MG/DL (ref 0.7–1.3)
ERYTHROCYTE [DISTWIDTH] IN BLOOD BY AUTOMATED COUNT: 16.2 % (ref 10.5–14.5)
GLUCOSE SERPL-MCNC: 87 MG/DL (ref 74–106)
HCT VFR BLD CALC: 34.2 % (ref 42–52)
HGB BLD-MCNC: 11 GM/DL (ref 14–18)
MCH RBC QN AUTO: 27.3 PG (ref 26–34)
MCHC RBC AUTO-ENTMCNC: 32.2 G/DL (ref 28–37)
MCV RBC: 84.7 FL (ref 80–100)
PLATELET # BLD: 265 THOU/UL (ref 150–400)
POTASSIUM SERPL-SCNC: 3.7 MMOL/L (ref 3.5–5.1)
RBC # BLD AUTO: 4.04 MIL/UL (ref 4.5–6)
SODIUM SERPL-SCNC: 140 MMOL/L (ref 136–145)
WBC # BLD AUTO: 5.6 THOU/UL (ref 4–11)

## 2021-06-14 NOTE — NUR
ASSUMED CARE OF PT AT 0700 HIS MORNING. PT WAS ADMITTED FOR CELLULITIS AND
EDEMA. PT HAS LEGS ELEVATED AMD BILAT WRAPS C/I/D. IV IN RIGHT FA, ASSESSENTS
AS NOTED IN CHARTS OHERWISE UNREMARKABLE. CALL LIGHT AND OTHER NEEDS ARE
WITHIN REACH. MEDS AND TX GIVEN AS NEEDED AND SCHEDULED.

## 2021-06-14 NOTE — NUR
ASSUMED CARE OF PT AT SHIFT CHANGE. PT IS AOX3 AND LETS NEEDS BE KNOWN. FALL
PRECAUTION IN PLACE. CARPIO IN PLACE AND PATIENT. PT REPORTED BACK PAIN AND
PRNS WERE PROVIDED. ASSESSMENT CHARTED. PT DENIED NAUSEA OR SOA. PT REFUSED
TURNS AT TIMES DUE TO PAIN. PT WAS ABLE TO GET COMFORTABLE AND SLEEP PART OF
THE SHIFT. VSS AND NO S/S OF ACUTE DISTRESS. WILL CONTINUE TO MONITOR.
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
## 2021-06-15 VITALS — DIASTOLIC BLOOD PRESSURE: 91 MMHG | SYSTOLIC BLOOD PRESSURE: 140 MMHG

## 2021-06-15 VITALS — SYSTOLIC BLOOD PRESSURE: 117 MMHG | DIASTOLIC BLOOD PRESSURE: 80 MMHG

## 2021-06-15 NOTE — NUR
CM FOLLOWED UP WITH PT AT BEDSIDE THIS DAY AND ASKED IF PT AND DPOA SISTER
MOY HAD REVIEWED SNF LIST FOR POSSIBLE PLACEMENT OPTIONS HE INDICATED THAT
THEY HADN'T YET. CM INDICATED THAT WE NEED TO LOOK FOR PLACEMENT AT PT WILL
NEED PROLONGED IV ABX UPON DC AND HE MAY BE MEDICALLY STABLE TO DC AS SOON A
TOMORROW PER THE CARE TEAM. HE INDICATED HE WOULD LOOK LIST OVER. CM
FOLLOWING REGARDING DC PLANNING.

## 2021-06-15 NOTE — NUR
PATIENT BACK/SACRAL/GLUTEAL WOUND, CLEANSED WITH NS, APPLIED BARRIER CREAM AND
LOTA. BLE WOUNDS ARE C/D/I. FALL PRECAUTION IN PLACE. PATIENT IN BED ASLEEP AT
THIS TIME BREATHING REGULAR AND UNLABOURED.

## 2021-06-16 VITALS — SYSTOLIC BLOOD PRESSURE: 129 MMHG | DIASTOLIC BLOOD PRESSURE: 86 MMHG

## 2021-06-16 VITALS — DIASTOLIC BLOOD PRESSURE: 64 MMHG | SYSTOLIC BLOOD PRESSURE: 101 MMHG

## 2021-06-16 VITALS — SYSTOLIC BLOOD PRESSURE: 148 MMHG | DIASTOLIC BLOOD PRESSURE: 106 MMHG

## 2021-06-16 NOTE — NUR
ASSUMED PT CARE YESTERDAY AT 0700. PT IS BEDREST AND IS A/OX4 AND IS
PARAPALEGIC. PT HAS SEVERAL WOUNDS ON HIS BUTTOCKS AND SACRAL AREA. PREPPED
WOUNDS WITH AMMONIA CREAM AND ZGARD WITH FOAM DRESSING ON LEFT ULCERATION.
BOTH LOWER EXTREMETIES ARE WRAPPED WITH KERLEX AND ACE WRAP, EVERY OTHER DAY
CHANGES. PT CALLS OUT FOR MINOR NEEDS VERY OFTEN AND HAVE TO SET LIMITS WITH
HIM. ASSESSMENTS WERE AS CHARTED AND OTHERWISE UNREMARKABLE. CALL LIGHT AND
OTHER NEEDS ARE PLACED WITHIN REACH. MEDS AND TX GIVEN AS NEEDED  AND
SCHEDULED.

## 2021-06-16 NOTE — NUR
Assumed pt care at 7am.Assessment completed.vss.Pt in bed most of the times
today.Repositioned q2h for comfort.Assisted pt with feeding at [facility].Good
appetite noted.Pt c/o back and buttom pain.Oral med given with relief.Dr Muñoz
here,wanted cm assisted with dc planning.Family here later this evening,
updates given.Pt in  bed at present resting and watching tv.Will continue to
monitor.

## 2021-06-16 NOTE — NUR
Pt. rested quietly at intervals during the night when checked on during
frequent rounds.  Pt. medicated for c/o pain (see emar) with some relief
noted.  Pt. refused to be turned during the shift.  Bed alarm is on.

## 2021-06-17 VITALS — DIASTOLIC BLOOD PRESSURE: 93 MMHG | SYSTOLIC BLOOD PRESSURE: 121 MMHG

## 2021-06-17 VITALS — SYSTOLIC BLOOD PRESSURE: 110 MMHG | DIASTOLIC BLOOD PRESSURE: 78 MMHG

## 2021-06-17 VITALS — SYSTOLIC BLOOD PRESSURE: 124 MMHG | DIASTOLIC BLOOD PRESSURE: 81 MMHG

## 2021-06-17 LAB
BACTERIA-REFLEX: (no result) /HPF
BILIRUB UR-MCNC: NEGATIVE MG/DL
CALCIUM OXALATE: (no result) /LPF
COLOR UR: YELLOW
HYALINE CASTS #/AREA URNS LPF: (no result) /LPF
KETONES UR STRIP-MCNC: NEGATIVE MG/DL
RBC # UR STRIP: (no result) /UL
RBC #/AREA URNS HPF: (no result) /HPF
SP GR UR STRIP: 1.02 (ref 1–1.03)
SQUAMOUS: (no result) /LPF (ref 0–3)
URINE CLARITY: CLEAR
URINE GLUCOSE-RANDOM*: NEGATIVE
URINE LEUKOCYTES-REFLEX: (no result)
URINE NITRITE-REFLEX: NEGATIVE
URINE PROTEIN (DIPSTICK): (no result)
UROBILINOGEN UR STRIP-ACNC: 0.2 E.U./DL (ref 0.2–1)

## 2021-06-17 NOTE — NUR
Assumed pt care at 7am.Pt in bed sleeping at the start of shift but arousable.
Pt woke up around breakfast.Am meds given with breakfast and well tolerated.
Dr Franco and Toni here,order noted.Urine obtained and sent to lab as ordered.
Pt requested for pain med later this afternoon,it was given with relief.
Repositioned q2h in bed for comfort.Pca fed pt at [facility].Pt will possibly dc
home in am with home health or to snf with iv antibiotic.No verbal c/o at
present.Will continue to monitor.

## 2021-06-17 NOTE — NUR
CARE TEAM INDICATED THAT UA WAS DONE THIS AM. AWAITING CULTURES AND
SENSITIVITIES TO DETERMINE IF PT CAN GO HOME ON ORAL ABX OR IF WILL NEED IV
ABX AND FACILITY PLACEMENT. CM FOLLOWING.

## 2021-06-17 NOTE — NUR
PATIENT AOX4 MAKES NEEDS KNOWN.PATIENT REFUSED TO BE TURNED. PATIENT REFUSED
WOUND CARE.PAIN CONTROLLED THIS SHIFT.BLE ELEVATED.  FALL PRECAUTION IN PLACE.
PATIENT IN BED ASLEEP AT THIS TIME BREATHING REGULAR AND UNLABOURED.

## 2021-06-18 VITALS — SYSTOLIC BLOOD PRESSURE: 138 MMHG | DIASTOLIC BLOOD PRESSURE: 89 MMHG

## 2021-06-18 NOTE — NUR
TRANSPORTATION ARRANGED TO PATIENT'S HOME AT [address] [place] [address] THROUGH [employer] BY [employer]AGENT, CONFIRMATION [id number].
THERE IS A 3 HOUR WINDOW FROM 3:00-6:00 BUT STATED THE PATIENT IS READY FOR
DISCHARGE. WILL CALL TO CONFIRM A TIME WITH NON-EMERGENCY [facility]. [phone]
[facility] P [phone]; [phone]

## 2021-06-18 NOTE — NUR
PT AND SISTER WANTING SKILLED. CM FAXED REFERRALS TO [facility], [facility], AND [facility] PER THEIR TREQUEST. [facility] ONLY
ONE WHO CAN ACCEPT. TRANSPORT VIA [employer] SET UP BETWEEN [phone].
CHART COPY MADE. ORDERES TO BE FAXED ONCE COMPLETED... NEVERMIND PATIENT IS
NOW WANTING TO RETURN HOME AND RESUME SERVICES WITH [facility]. PT'S
SISTER IS AWARE AND AGREEABLE WITH DC HOME THIS DAY. CM TO ARRANGE [employer]
[facility] TRANSPORT AND TELL MOY OF TIME. CM NOTIFIED CAROL AT [facility] TO CANCEL ADMISSION.

## 2021-06-18 NOTE — NUR
Assumed pt care at 7am.Pt in bed resting .Assessment completed.vss.Pt
tolerated meds and diet.Fed by pca at [facility].Sister here early this am and
requested for pt to dc to snf due to extensive care which cannot be met by
family. arranged for transport and chart copy done.Before pt
pickup by trans[port at 1600,family called cm back and change their mind in
keeping pt at home instead of snf.Pt refused wound care and picture taking.
Dc summary compile and reviewed with pt.Family took all pt personal
belongings.Oral pain med given prior to dc home per non medical ambulance at
1621.

## 2021-07-14 ENCOUNTER — HOSPITAL ENCOUNTER (INPATIENT)
Dept: HOSPITAL 35 - ER | Age: 61
LOS: 8 days | Discharge: NURSING, REHAB OR LONG-TERM CARE | DRG: 689 | End: 2021-07-22
Attending: INTERNAL MEDICINE | Admitting: INTERNAL MEDICINE
Payer: COMMERCIAL

## 2021-07-14 VITALS — SYSTOLIC BLOOD PRESSURE: 138 MMHG | DIASTOLIC BLOOD PRESSURE: 92 MMHG

## 2021-07-14 VITALS — DIASTOLIC BLOOD PRESSURE: 54 MMHG | SYSTOLIC BLOOD PRESSURE: 123 MMHG

## 2021-07-14 VITALS — DIASTOLIC BLOOD PRESSURE: 79 MMHG | SYSTOLIC BLOOD PRESSURE: 118 MMHG

## 2021-07-14 VITALS — SYSTOLIC BLOOD PRESSURE: 134 MMHG | DIASTOLIC BLOOD PRESSURE: 84 MMHG

## 2021-07-14 VITALS
HEIGHT: 69.02 IN | DIASTOLIC BLOOD PRESSURE: 92 MMHG | WEIGHT: 281.07 LBS | BODY MASS INDEX: 41.63 KG/M2 | SYSTOLIC BLOOD PRESSURE: 138 MMHG

## 2021-07-14 DIAGNOSIS — I73.9: ICD-10-CM
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
## 2021-07-14 DIAGNOSIS — I89.0: ICD-10-CM

## 2021-07-14 DIAGNOSIS — Z79.891: ICD-10-CM

## 2021-07-14 DIAGNOSIS — G89.4: ICD-10-CM

## 2021-07-14 DIAGNOSIS — Z95.820: ICD-10-CM

## 2021-07-14 DIAGNOSIS — Z74.01: ICD-10-CM

## 2021-07-14 DIAGNOSIS — Z51.5: ICD-10-CM

## 2021-07-14 DIAGNOSIS — E43: ICD-10-CM

## 2021-07-14 DIAGNOSIS — I42.9: ICD-10-CM
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
## 2021-07-14 DIAGNOSIS — R53.81: ICD-10-CM

## 2021-07-14 DIAGNOSIS — Z79.899: ICD-10-CM

## 2021-07-14 DIAGNOSIS — Z88.6: ICD-10-CM

## 2021-07-14 DIAGNOSIS — Z66: ICD-10-CM

## 2021-07-14 DIAGNOSIS — Z86.14: ICD-10-CM

## 2021-07-14 DIAGNOSIS — B96.5: ICD-10-CM

## 2021-07-14 DIAGNOSIS — S16.1XXA: ICD-10-CM

## 2021-07-14 DIAGNOSIS — L89.209: ICD-10-CM

## 2021-07-14 DIAGNOSIS — G20: ICD-10-CM

## 2021-07-14 DIAGNOSIS — I87.2: ICD-10-CM

## 2021-07-14 DIAGNOSIS — Z20.822: ICD-10-CM

## 2021-07-14 DIAGNOSIS — G92: ICD-10-CM
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
## 2021-07-14 DIAGNOSIS — I50.32: ICD-10-CM

## 2021-07-14 DIAGNOSIS — L89.159: ICD-10-CM

## 2021-07-14 DIAGNOSIS — N39.0: Primary | ICD-10-CM
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
## 2021-07-14 DIAGNOSIS — K21.9: ICD-10-CM

## 2021-07-14 LAB
ALBUMIN SERPL-MCNC: 2.6 G/DL (ref 3.4–5)
ALT SERPL-CCNC: 12 U/L (ref 16–63)
ANION GAP SERPL CALC-SCNC: 8 MMOL/L (ref 7–16)
AST SERPL-CCNC: 13 U/L (ref 15–37)
BACTERIA-REFLEX: (no result) /HPF
BASOPHILS NFR BLD AUTO: 0.9 % (ref 0–2)
BILIRUB DIRECT SERPL-MCNC: < 0.1 MG/DL
BILIRUB SERPL-MCNC: 0.3 MG/DL (ref 0.2–1)
BILIRUB UR-MCNC: NEGATIVE MG/DL
BUN SERPL-MCNC: 6 MG/DL (ref 7–18)
CALCIUM SERPL-MCNC: 8.5 MG/DL (ref 8.5–10.1)
CHLORIDE SERPL-SCNC: 103 MMOL/L (ref 98–107)
CO2 SERPL-SCNC: 28 MMOL/L (ref 21–32)
COLOR UR: YELLOW
CREAT SERPL-MCNC: 1 MG/DL (ref 0.7–1.3)
EOSINOPHIL NFR BLD: 5.2 % (ref 0–3)
ERYTHROCYTE [DISTWIDTH] IN BLOOD BY AUTOMATED COUNT: 16.4 % (ref 10.5–14.5)
GLUCOSE SERPL-MCNC: 103 MG/DL (ref 74–106)
GRANULOCYTES NFR BLD MANUAL: 62.3 % (ref 36–66)
HCT VFR BLD CALC: 37.6 % (ref 42–52)
HGB BLD-MCNC: 12.1 GM/DL (ref 14–18)
KETONES UR STRIP-MCNC: NEGATIVE MG/DL
LYMPHOCYTES NFR BLD AUTO: 20.4 % (ref 24–44)
MCH RBC QN AUTO: 27.2 PG (ref 26–34)
MCHC RBC AUTO-ENTMCNC: 32.1 G/DL (ref 28–37)
MCV RBC: 84.7 FL (ref 80–100)
MONOCYTES NFR BLD: 11.2 % (ref 1–8)
MUCUS: (no result) STRN/LPF
NEUTROPHILS # BLD: 4.2 THOU/UL (ref 1.4–8.2)
PLATELET # BLD: 408 THOU/UL (ref 150–400)
POTASSIUM SERPL-SCNC: 3.8 MMOL/L (ref 3.5–5.1)
PROT SERPL-MCNC: 7.9 G/DL (ref 6.4–8.2)
RBC # BLD AUTO: 4.43 MIL/UL (ref 4.5–6)
RBC # UR STRIP: (no result) /UL
RBC #/AREA URNS HPF: (no result) /HPF
SODIUM SERPL-SCNC: 139 MMOL/L (ref 136–145)
SP GR UR STRIP: 1.02 (ref 1–1.03)
URINE CLARITY: (no result)
URINE GLUCOSE-RANDOM*: NEGATIVE
URINE LEUKOCYTES-REFLEX: (no result)
URINE NITRITE-REFLEX: NEGATIVE
URINE PROTEIN (DIPSTICK): (no result)
URINE WBC-REFLEX: (no result) /HPF (ref 0–5)
UROBILINOGEN UR STRIP-ACNC: 0.2 E.U./DL (ref 0.2–1)
WBC # BLD AUTO: 6.7 THOU/UL (ref 4–11)

## 2021-07-14 PROCEDURE — 27000 TENOTOMY ADDUCTOR HIP PERQ: CPT

## 2021-07-14 PROCEDURE — 10040 EXTRACTION: CPT

## 2021-07-14 NOTE — NUR
PT ADMIT TO 4W @
APPROX 1200. PT HAS SCATTERED WOUNDS THAT WILL BE PHOTOGRAPHED.
BILATERAL LOWER EXTREMITES WRAPPED IN ACE WRAPS, THESE WILL BE LEFT FOR WOUND
CARE TO EVALUATE. MIDLINE PLACED IN RIGHT UPPER BY IV ACCESS TEAM. BLOOD AND
URINE CULTURES SENT TO LAB. PASSED SWALLOW STUDY BY SPEECH THERAPY. PT HAS
BEEN THOUROUGHLY UPDATED AND EDUCATED ON CONDITION AND POC.  PT SLOWLY
PROGRESSING TOWARDS POC.

## 2021-07-14 NOTE — EMS
[place] [address]                     EMS Patient Care Report       
_______________________________________________________________________________
 
Name:       VIOLA PARR                Room #:         450-P       ADM IN  
M.R.#:      [id number]                       Account #:      [id number]  
Admission:  21    Attend Phys:    Jose Fatima MD      
Discharge:              Date of Birth:  60  
                                                          Report #: [id number]
                                                                    [id number]
_______________________________________________________________________________
THIS REPORT FOR:   //name//                          
 
Report Transmitted: 2021 08:50
EMS Care Summary
[place], [place]/[facility]
Incident [id number] @ 2021 06:33
 
Incident Location
[address] [place] [address]
 
Patient
VIOLA PARR
Male, 60 Years
 1960
 
Patient Address
[address] [place] [address]
 
Patient History
Other,Parkinson's Disease,Gastro-Esophageal Reflux Disease (GERD),Urinary Tract 
Infection (UTI),Sepsis,Pressure Ulcer,Restless Leg Syndrome,Enlarged prostate, 
 
Patient Allergies
Morphine,
 
Patient Medications
Ropinirole, Potassium, Tamsulosin, Famotidine, Oxycodone, Zinc, Pantoprazole, 
Sinemet, Furosemide, 
 
Chief Complaint
loose stool
 
Disposition
Transported No Lights/[facility]
 
Dispatch Reason
Headache
 
Transported To
[facility]
 
Narrative
Arrived to find pt in hospital bed in basement room. Pt is bed bound. Pt 
complains of loose stool for the past 3 days and neck pain with no trauma. Pt 
 
 
 
[place] [address]                     EMS Patient Care Report       
_______________________________________________________________________________
 
Name:       VIOLA PARR                Room #:         [facility]-P       ADM IN  
[facility].#:      [id number]                       Account #:      [id number]  
Admission:  21    Attend Phys:    Jose Fatima MD      
Discharge:              Date of Birth:  60  
                                                          Report #: [id number]
                                                                    [id number]
_______________________________________________________________________________
has long history of C-diff. Pt requests [facility] however they are High Volume so 
pt agrees to go [facility]. Lift assist ordered. P36 arrives and pt carried on femi 
 up stairs to backyard and placed on cot. Pt secured to cot with cot 
straps. Pt placed in back of unit and placed in surgical mask. Pt transported 
without issue. Care to RN. 
 
Initial Vitals
@06:57P: 89,BP: 119/75,CO: 2,SpO2: 97,
@06:56P: 90,R: 16,BP: 121/83,Pain: 4/10,GCS: 15,CO: 1,SpO2: 97,Revised Trauma: 
12, 
 
Assessments
@06:45MENTAL:No Abnormalities,SKIN:No Abnormalities,HEENT:Head/Face: No 
Abnormalities,Eyes: No Abnormalities,Neck/Airway: No Abnormalities,LUNG 
SOUNDS:General: Diarrhea,Left Upper: No Abnormalities,Right Upper: No 
Abnormalities,Left Lower: No Abnormalities,Right Lower: No 
Abnormalities,ABDOMEN:General: Diarrhea,Left Upper: No Abnormalities,Right 
Upper: No Abnormalities,Left Lower: No Abnormalities,Right Lower: No 
Abnormalities,PELVIS//GI:Incontinence,EXTREMITIES:Left Arm: Weakness,Right 
Arm: Weakness,Left Leg: Weakness,Right Leg: Weakness,PULSE:NEURO:No 
Abnormalities, 
 
Impression
Diarrhea
 
Procedures
@06:45ALS AssessmentResponse: UnchangedSucceeded
 
Timeline
06:32,Call Received
06:32,Dispatch Notified
06:33,Dispatched
06:35,En Route
06:39,On Scene
06:44,At Patient
06:45,ALS Assessment,Response: UnchangedSucceeded,
06:56,BP: 121/83 M,PULSE: 90,RR: 16 R,SPO2: 97 Ox,ETCO2:  ,BG: ,PAIN: 4,GCS: 15,
06:57,BP: 119/75 M,PULSE: 89,RR:  R,SPO2: 97 Ox,ETCO2:  ,BG: ,PAIN: ,GCS: ,
06:59,Depart Scene
07:08,At Destination
07:18,Call Closed
 
Disclaimer
v1.1     Copyright  [employer]
This EMS Care Summary contains data elements from the applicable legal record 
(which may be displayed differently). It is designed to provide pertinent 
 
 
 
[place] [address]                     EMS Patient Care Report       
_______________________________________________________________________________
 
Name:       KESHAVVIOLA ECTOR                Room #:         450-P       [facility] [place]  
.R.#:      [id number]                       Account #:      [id number]  
Admission:  21    Attend Phys:    Jose Fatima MD      
Discharge:              Date of Birth:  60  
                                                          Report #: [id number]
                                                                    [id number]
_______________________________________________________________________________
information for the following purposes: continuity of care, clinical quality, 
and state data reporting. The complete legal record is available to ED staff 
and administrators of the receiving hospital in [facility]'s Patient Tracker. All data 
is provided "as is."

## 2021-07-14 NOTE — NUR
ASSUME PT CARE FROM [facility] 1600. NO DIET ORDER PUT IN, PAGE  AT
16:25. WILL FOLLOW ORDERS AND MONITOR PATIENTS' SAFETY.

## 2021-07-14 NOTE — NUR
ACTIVE WOUNDS SUSPECTED AFTER PT ADDMISSION. SKIN TEARS ON LEFT BOTTOM AND MID
OF BOTTOM. CLEANSED WITH NS AND APPLIED ON 4*4 FOAM DRESSING FOR EACH TEAR
SITE. ABNORMAL SKIN CHANGED OBSERVED BETWEEN PT'S LEG AND EXTERNAL SIDE OF THE
LEFT THIGH. EDEMA ON BOTH LOWER LEGS AND DRESSING COVERED. NEED FURTHER
ASSESSMENT FOR WOUNDS ON LEGS. DRY SKIN AND Z-GAURD CREAM APPLIED ON THE BACK.
WOUND CARE PLAN IS ACTIVE. WILL PASS ON TO SUGGEST WOUND CONSULT FOR PATIENT.

## 2021-07-15 VITALS — DIASTOLIC BLOOD PRESSURE: 73 MMHG | SYSTOLIC BLOOD PRESSURE: 115 MMHG

## 2021-07-15 VITALS — SYSTOLIC BLOOD PRESSURE: 125 MMHG | DIASTOLIC BLOOD PRESSURE: 78 MMHG

## 2021-07-15 VITALS — DIASTOLIC BLOOD PRESSURE: 75 MMHG | SYSTOLIC BLOOD PRESSURE: 127 MMHG

## 2021-07-15 LAB
ANION GAP SERPL CALC-SCNC: 10 MMOL/L (ref 7–16)
BUN SERPL-MCNC: 5 MG/DL (ref 7–18)
CALCIUM SERPL-MCNC: 8.1 MG/DL (ref 8.5–10.1)
CHLORIDE SERPL-SCNC: 104 MMOL/L (ref 98–107)
CO2 SERPL-SCNC: 26 MMOL/L (ref 21–32)
CREAT SERPL-MCNC: 0.8 MG/DL (ref 0.7–1.3)
ERYTHROCYTE [DISTWIDTH] IN BLOOD BY AUTOMATED COUNT: 16.2 % (ref 10.5–14.5)
GLUCOSE SERPL-MCNC: 89 MG/DL (ref 74–106)
HCT VFR BLD CALC: 34.3 % (ref 42–52)
HGB BLD-MCNC: 11 GM/DL (ref 14–18)
MAGNESIUM SERPL-MCNC: 1.9 MG/DL (ref 1.8–2.4)
MCH RBC QN AUTO: 26.9 PG (ref 26–34)
MCHC RBC AUTO-ENTMCNC: 32.1 G/DL (ref 28–37)
MCV RBC: 83.9 FL (ref 80–100)
PLATELET # BLD: 369 THOU/UL (ref 150–400)
POTASSIUM SERPL-SCNC: 3.7 MMOL/L (ref 3.5–5.1)
RBC # BLD AUTO: 4.08 MIL/UL (ref 4.5–6)
SODIUM SERPL-SCNC: 140 MMOL/L (ref 136–145)
WBC # BLD AUTO: 5.6 THOU/UL (ref 4–11)

## 2021-07-15 NOTE — NUR
Assumed pt care this am, VS stable. Pt is bed bound requires frequent visits
due to needs. Wound care team involved, dermatitis noted all mostly on his
back side. Did not have a BM for this shift, last BM was 7/13. Fc in place,
draining couldy tea colored urine. POC followed, pain is managed with
medications partial relief is noted. Awaiting stool sample. Endorsed to the
night nurse.

## 2021-07-15 NOTE — NUR
PT ADMITTED RELATED TO PT ADMITTED RELATED TO CERVICAL STRAIN, UTI, AND
DECUBITUS ULCERS. CM REVEIWED CHART AND SPOKE WITH CARE TEAM. CM MET WITH PT
AT BEDSIDE THIS DAY. PT IS FAMILIAR TO CM FROM PREVIOUS ADMISSIONS. PT HAD
DISCHARGED HOME JUNE 6/18. PT INDICATED HE RESIDES IN A HOUSE WITH HIS MOTHER
AND SISTER. PT INDICATED HE HAD A MANUAL WC AND AN ELECTRIC WC FOR HOME USE.
PT INDICATED HE HADN'T BEEN ABLE TO DO OWN TRANSFERES PT PTA PT INDICATED THAT
HIS SISTER OR FIREFIGHTERS ASSIST. PT INDICATED HE HAS A LIFT DEVICE BUT THAT
IT DOESN'T WORK WELL. PT CONFIRMED THAT HE HAD GONE SKILLED TO [facility] IN [place]. DURING LAST ADMISSION PT HAD BEEN ACCEPTED FOR ADMISSION
TO [facility] AND THEN LAST MIN PT AND FAMILY DECIDED TO DC HOME
INSTEAD. PT HAD BEEN ON SERVICE [facility] PTA. CM ASKED PT IF HE
WAS RECEPTIVE TO POST ACUTE CARE STAY UPON DC THIS TIME AND PT INDICATED HE
NEEDED TO SPEAK TO THE DOCTOR AND HIS FAMILY BEFORE HE DECIDES. CM ASKED IF
REFERRAL COULD BE SENT TO [facility] AS THEY HAD ACCEPTED PT BEFORE
AND PT ASKED THAT CM WAIT TO SEND ANY INFO. CM FOLLOWING REGARDING DC
PLANNING. PT IS ON DAILY IV CEFTRIAXONE. NO SKILLABLE PT OR OT NEEDS
IDENTIFIED.

## 2021-07-15 NOTE — NUR
PT HAS NO DIET ORDERS.CALLED NP ON DUTY,ORDER NOTED TO LEAVE PT NPO UNTIL SEEN
BY PHYSICIAN THIS AM.BLE EDEMA NOTED.WRAPPED WITH ZEROFAOM,KERLIX AND ACE
WRAP.BLE ELEVATED WITH A PILLOW.DRSG TO WOUND ON HIS BUTTOCK,COMPLETE BY AM
NURSE.BLE SKIN DRY AND FLAKY.CARPIO CATH IN PLACE.CALL LIGHT WITHIN REACH.

## 2021-07-16 VITALS — SYSTOLIC BLOOD PRESSURE: 107 MMHG | DIASTOLIC BLOOD PRESSURE: 62 MMHG

## 2021-07-16 VITALS — DIASTOLIC BLOOD PRESSURE: 78 MMHG | SYSTOLIC BLOOD PRESSURE: 113 MMHG

## 2021-07-16 VITALS — SYSTOLIC BLOOD PRESSURE: 118 MMHG | DIASTOLIC BLOOD PRESSURE: 69 MMHG

## 2021-07-16 NOTE — NUR
PT HAS BEEN SITTING UP EATING SNACKS TONIGHT WHILE WATCHING HIS TV PROGRAMS.
HE REFUSES TO ALLOW A TURN UP TO THIS TIME TONIGHT. HE SPOKE ON THE PHONE WITH
HIS MOTHER AND IS, ENJOYING EATING HIS SNACKS FROM HOME.

## 2021-07-16 NOTE — NUR
Assumed pt care this am vs stable, complete bed bath given frederick care done.
Very needy pt would call out constantly even after needs have been met. Wound
care done. Diet and medications tolerated. POC follwed, fc in place draining
cloudy urine. Pain is managed with medications, when offered q2 turns wound
refused and if able to he would ask to be placed back since the repositioning
is "uncomfortable". sisters at the bedside.
 
Endorsed to the night nurse.

## 2021-07-16 NOTE — NUR
Nutrition: pt admitted with diarrhea, neck pain, UTI, recurrent
encephalopathy. PMH: Parkinsons, PVD, PAD, CHF, GERD, chronic gluteal wounds
listed as stage 2 last month by wound care. Also BLE vascular wounds. Pt has
difficulty with dexterity and finger foods are ordered. Would recommend assist
with meals as needed. Eats fairly well, 50-75% of meals recorded so far and
no weight loss, rather gain over time. BMI 41, extreme class 3 obesity. Pt
will drink ensure-will offer ensure max BID. Reinforced nutrition/protein
needs. Place as low risk with interventions in place.

## 2021-07-16 NOTE — NUR
ASSUMED PT CARE AT 1930. PT IS ALERT AND ORIENTED X4. PT C/O PAIN TO BLE WHICH
WAS MANAGED BY PAIN MEDS. PT HAS ACE WRAPPINGS TO BLE WHICH ARE DRY AND INTACT
WITH NO DRAINAGE. PT REFUSED Q2HRS TURNS. PER PT" IT IS UNCOMFORTABLE" PT HAS
CARPIO. TOLERATING ROOM AIR. PT DID NOT HAVE BM ON THIS SHIFT; STILL PENDING
STOOL SAMPLE. FALL PREACAUTIONS IN PLACE. WILL CONTINUE TO MONITOR

## 2021-07-17 VITALS — SYSTOLIC BLOOD PRESSURE: 116 MMHG | DIASTOLIC BLOOD PRESSURE: 78 MMHG

## 2021-07-17 VITALS — SYSTOLIC BLOOD PRESSURE: 113 MMHG | DIASTOLIC BLOOD PRESSURE: 76 MMHG

## 2021-07-17 VITALS — SYSTOLIC BLOOD PRESSURE: 133 MMHG | DIASTOLIC BLOOD PRESSURE: 80 MMHG

## 2021-07-17 NOTE — NUR
Patient has not had a BM today-miralax given. Stool sample still needs to be
collected. ABT Ceftazidime given, tolerated well, no adverse effects noted.
Patient will sometime yell out for help and educated about using call button.
Penaloza patent and draining well- urine dark yellow.

## 2021-07-17 NOTE — NUR
pt is very weak and needs assist with having all of his items close by. he is
able to feed himself some cookies or chips. encouraged turns q2 hours. he is
more cooperative this morning. continues on iv antibiotics. wound to buttocks
needs attention to care, barrier cream applied, and chux pad under him
changed. continues in isolation, contact.

## 2021-07-18 VITALS — DIASTOLIC BLOOD PRESSURE: 83 MMHG | SYSTOLIC BLOOD PRESSURE: 140 MMHG

## 2021-07-18 VITALS — SYSTOLIC BLOOD PRESSURE: 121 MMHG | DIASTOLIC BLOOD PRESSURE: 87 MMHG

## 2021-07-18 VITALS — DIASTOLIC BLOOD PRESSURE: 80 MMHG | SYSTOLIC BLOOD PRESSURE: 118 MMHG

## 2021-07-18 LAB
ANION GAP SERPL CALC-SCNC: 8 MMOL/L (ref 7–16)
BUN SERPL-MCNC: 6 MG/DL (ref 7–18)
CALCIUM SERPL-MCNC: 8.5 MG/DL (ref 8.5–10.1)
CHLORIDE SERPL-SCNC: 103 MMOL/L (ref 98–107)
CO2 SERPL-SCNC: 29 MMOL/L (ref 21–32)
CREAT SERPL-MCNC: 0.9 MG/DL (ref 0.7–1.3)
ERYTHROCYTE [DISTWIDTH] IN BLOOD BY AUTOMATED COUNT: 16.3 % (ref 10.5–14.5)
GLUCOSE SERPL-MCNC: 118 MG/DL (ref 74–106)
HCT VFR BLD CALC: 35.2 % (ref 42–52)
HGB BLD-MCNC: 11.3 GM/DL (ref 14–18)
MCH RBC QN AUTO: 27.1 PG (ref 26–34)
MCHC RBC AUTO-ENTMCNC: 32.2 G/DL (ref 28–37)
MCV RBC: 84.2 FL (ref 80–100)
PLATELET # BLD: 397 THOU/UL (ref 150–400)
POTASSIUM SERPL-SCNC: 3.4 MMOL/L (ref 3.5–5.1)
RBC # BLD AUTO: 4.17 MIL/UL (ref 4.5–6)
SODIUM SERPL-SCNC: 140 MMOL/L (ref 136–145)
WBC # BLD AUTO: 6.7 THOU/UL (ref 4–11)

## 2021-07-18 NOTE — NUR
ASSUMED PT AT 1920. PT ALERT AND ORIENTED X4. PT WOUND DRSG ON THE FEET DRY
AND INTACT. PT COMPLAINED PAIN TO THE SACRUM WHICH WAS MANAGED BY PAIN MED. PT
TOLERATING RA. PT CAN BE NEEDY AT TIMES AND CAN SOMETIMES BE INDECISIVE ON
WHETHER OR NOT WANTS TO BE TURNED. PT DID NOT EXPRESS ANY CONCERNS AND NO
VISIBLE SIGN OF DISTRESS NOTED. FALL PRECAUTIONS WITH CALL LIGHT WITHIN REACH.

## 2021-07-18 NOTE — NUR
Today is the patients birthday and his sisters came to visit him. Tolerated IV
cefazidime well- no adverse effects noted. Able to feed himself with set up.
Pain medication given per schedule.

## 2021-07-18 NOTE — NUR
ASSISTED WITH PT CARE. PT IS AOX3 AND LETS NEEDS BE KNOWN. FALL PRECAUTION IN
PLACE. PT TURNED Q2H. BARRIER CREAM APPLIED ON BACK AND BUTTOCKS. PT REPORTED
PAIN; SECHDULED PAIN MEDS PROVIDED. PT IS ON LASIX BUT LIKES PO FLUID INTAKE.
PT MAY NEED FLUID RESTRICTION. VSS AND NO S/S OF ACUTE DISTRESS. WILL
CONTINUE TO MONITOR.

## 2021-07-19 VITALS — SYSTOLIC BLOOD PRESSURE: 149 MMHG | DIASTOLIC BLOOD PRESSURE: 97 MMHG

## 2021-07-19 VITALS — DIASTOLIC BLOOD PRESSURE: 65 MMHG | SYSTOLIC BLOOD PRESSURE: 118 MMHG

## 2021-07-19 VITALS — DIASTOLIC BLOOD PRESSURE: 92 MMHG | SYSTOLIC BLOOD PRESSURE: 141 MMHG

## 2021-07-19 LAB
BILIRUB UR-MCNC: NEGATIVE MG/DL
COLOR UR: YELLOW
HYALINE CASTS #/AREA URNS LPF: (no result) /LPF
KETONES UR STRIP-MCNC: NEGATIVE MG/DL
MUCUS: (no result) STRN/LPF
NITRITE UR QL STRIP: POSITIVE
RBC # UR STRIP: (no result) /UL
RBC #/AREA URNS HPF: (no result) /HPF
SP GR UR STRIP: 1.02 (ref 1–1.03)
SQUAMOUS: (no result) /LPF (ref 0–3)
URINE CLARITY: CLEAR
URINE GLUCOSE-RANDOM*: NEGATIVE
URINE LEUKOCYTES: (no result)
URINE PROTEIN (DIPSTICK): NEGATIVE
UROBILINOGEN UR STRIP-ACNC: 0.2 E.U./DL (ref 0.2–1)
WBC #/AREA URNS HPF: (no result) /HPF
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
## 2021-07-19 NOTE — NUR
ASSUMED CARE OF PATIENT AT SHIFT CHANGE. ASSESSMENT AS CHARTED. MEDS
ADMINISTERED PER EMAR; 2-3 AT A TIME WITH A SPOON & SIPS OF WATER. VOICES PAIN
AT 4/10 RELIEVED BY SCHEDULED MEDICATION. NOTED BLEEDING ON "CRACKS" OF SACRAL
AREA. PROVIDER NOTIFIED; WOUND CARE CONSULTED, SEE NEW ORDERS. PATIENT SKIN
STILL EDEMATOUS AND WEEPING. CLEANSED AND USING ZGUARD/AMMONIUM LACTATE AS
NEEDED AND PER ORDER. FREQUENT REPOSITIONING COMPLETED Q2HRS AND PER PATIENT
REQUEST. TOLERATING PO INTAKE WELL W NO ISSUES. PATIENT STATES "FEELING
BETTER" AND FEELS GOOD ABOUT DISCHARGING SOON IF PROVIDERS BELIVE SO. NO
FURTHER ISSUES NOTED/VOICED. FALL PRECAUTIONS REMAIN IN PLACE. CONTINUING
FREQUENT MONITORING THROUGHOUT SHIFT.

## 2021-07-19 NOTE — NUR
CM FOLLOWED UP WITH PT AT BEDSIDE THIS DAY. CM ASKED PT ABOUT PREFERENCE FOR
HOME WITH [facility] VS. SHORT TERM SKILLED REHAB STAY. IT SEEMS LIKE PT IS
RECEPTIVE TO SKILLED UPON DC. PT DOESN'T KNOW WHERE HE WANTS TO GO. DOESN'T
WANT TO GO TO [facility] WHO HAD ACCEPTED HIM BEFORE AS THEY HAD
SOME BAD REVIEWS. CM PROVIDED SNF LIST FOR REVIEW. CM CALLED AND SPOKE WITH
PT'S SISTER MOY. SHE INDICATED THAT SISTER MELBA WOULD CALL CM WITH NAMES
OF SOME OTHER FACILITIES THEY WERE BE RECEPTIVE TO LOOKING AT AND THAT SHE OR
MEBLA WOULD BE BY THIS AFTERNOON/EVENING TO LOOK AT LIST WITH PT. THEY WERE OK
WITH REFERRAL BEING SENT TO [facility]. REFERRAL FAXED. CM FOLLOWING
REGARDING PLACEMENT.

## 2021-07-20 VITALS — DIASTOLIC BLOOD PRESSURE: 68 MMHG | SYSTOLIC BLOOD PRESSURE: 109 MMHG

## 2021-07-20 VITALS — SYSTOLIC BLOOD PRESSURE: 135 MMHG | DIASTOLIC BLOOD PRESSURE: 81 MMHG

## 2021-07-20 VITALS — SYSTOLIC BLOOD PRESSURE: 119 MMHG | DIASTOLIC BLOOD PRESSURE: 72 MMHG

## 2021-07-20 LAB
ANION GAP SERPL CALC-SCNC: 5 MMOL/L (ref 7–16)
BUN SERPL-MCNC: 7 MG/DL (ref 7–18)
CALCIUM SERPL-MCNC: 8.7 MG/DL (ref 8.5–10.1)
CHLORIDE SERPL-SCNC: 105 MMOL/L (ref 98–107)
CO2 SERPL-SCNC: 30 MMOL/L (ref 21–32)
CREAT SERPL-MCNC: 0.9 MG/DL (ref 0.7–1.3)
ERYTHROCYTE [DISTWIDTH] IN BLOOD BY AUTOMATED COUNT: 16 % (ref 10.5–14.5)
GLUCOSE SERPL-MCNC: 104 MG/DL (ref 74–106)
HCT VFR BLD CALC: 36.4 % (ref 42–52)
HGB BLD-MCNC: 11.6 GM/DL (ref 14–18)
MAGNESIUM SERPL-MCNC: 1.8 MG/DL (ref 1.8–2.4)
MCH RBC QN AUTO: 26.8 PG (ref 26–34)
MCHC RBC AUTO-ENTMCNC: 31.7 G/DL (ref 28–37)
MCV RBC: 84.3 FL (ref 80–100)
PLATELET # BLD: 401 THOU/UL (ref 150–400)
POTASSIUM SERPL-SCNC: 3.7 MMOL/L (ref 3.5–5.1)
RBC # BLD AUTO: 4.32 MIL/UL (ref 4.5–6)
SODIUM SERPL-SCNC: 140 MMOL/L (ref 136–145)
WBC # BLD AUTO: 5.3 THOU/UL (ref 4–11)

## 2021-07-20 NOTE — NUR
GRACIELA MET WITH PT THIS AM AND THEN CALLED PT'S SISTER MOY. SHE INDICATE THAT
DR. COLON INDICATED HE HAD A FEW FACILITIES HE RECOMMENDED ALONG WITH WC. GRACIELA
FAXED REFERRALS TO [facility], [facility], AND [facility]. GRACIELA SPOKE WITH PT'S SISTER ALTHEA AND SHE INDICATED SHE DIDN'T THINK
THEY WOULD BE OK WIHT [place]. SHE WILL CALL CM BACK IF SHE
THINKS OF ANY OTHER PLACES THEY WANT REFERRALS SENT. SHE ASKED THAT CM SEND
REFERRAL TO [facility]. GRACIELA TOSCARLYLE.

## 2021-07-20 NOTE — NUR
PT CARE ASSUMED WITH PT WATCHING TV AT 1900.PT IS A/O X4.PT IS ON BEDREST AND
HAS A CARPIO CATHETER IN PLACE.PT TAKE MEDS WHOLE WITH NO ISSUES NOTED.PT HAS
SCHEDULE PAIN MEDICINES FOR PAIN MANAGEMENT.PT HAS MULTIPLE WOUNDS TO BE DONE
WITH WOUND CLEANSER AND DRY TO AIR.PT IS Q2H REPOSITION AND PRN WHEN PT
DEMANDS.IV ACCESS ON RT AC SL.WILL CONTINUE TO MONITOR

## 2021-07-21 VITALS — DIASTOLIC BLOOD PRESSURE: 73 MMHG | SYSTOLIC BLOOD PRESSURE: 118 MMHG

## 2021-07-21 VITALS — SYSTOLIC BLOOD PRESSURE: 122 MMHG | DIASTOLIC BLOOD PRESSURE: 81 MMHG

## 2021-07-21 VITALS — SYSTOLIC BLOOD PRESSURE: 116 MMHG | DIASTOLIC BLOOD PRESSURE: 75 MMHG

## 2021-07-21 NOTE — NUR
PT WAS SWITCHED TO CEFTRAZIDIME(AVYCAZ) Q8 VIA IV. HOSPITALIST CONSULTED ID
TODAY DR. MUELLER. CM FAXED CLINICAL UPDATE TO [facility] AS THEY HAVE
ACCEPTED PT FOR ADMISSION. AWAITING ID CONSULT. FAMILY AND FACIILTY ARE AWARE.
CM FOLLOWING REGARDING DC PLANNING.

## 2021-07-21 NOTE — NUR
PT IS A&O*4 WITH APHASIA. CONTACT ISDOLATION FOR MRSA. BED REST DUE TO
IMMOBILITY. Q2 TURN PATIENTS AND PATIENTS REFUSED TWO TIMES TURNING SIDES. BED
BATH PROVIDED AND DRESSING CHANGED FOR BOTTOM WOUNDS AND BLITERAL LEGS PER
ORDER. BOTTOM WOUND IS OPEN WOUND, BLEEDING FOUND DURING DRESSING CHANGE.
WOUNDS' PICS TOOK AND PRINTED IN THE CHART.
WILL
REFORCE FALL RISK AND WOUND CARE EDUCATION WITH PATIENT. BED ALARM IS ON AND
CALL LIGHT BE SIDE OF PT. WILL KEEP MONITOR PATIENT'S SAFETY.

## 2021-07-21 NOTE — NUR
ASSUMED PT [id number]. PT IS ALERT AND OREINTED X4. PT TAKES MEDS WHOLE. PT HAS
ACE WRAPPING ON BLE. DRSG IS INTACT WITH NO DRAINAGE. PT C/O PAIN TO BLE WHICH
IS MANAGED BY SCHEDULED PAIN MEDS. PT IS TOLERATING RA. PT DID NOT HAVE BM ON
THIS SHIFT. FALL PRECAUTIONS IN PLACE WITH CALL LIGHT WITHIN REACH. WILL
CONTINUE TO MONITOR.

## 2021-07-22 VITALS — DIASTOLIC BLOOD PRESSURE: 97 MMHG | SYSTOLIC BLOOD PRESSURE: 139 MMHG

## 2021-07-22 PROCEDURE — 05HY33Z INSERTION OF INFUSION DEVICE INTO UPPER VEIN, PERCUTANEOUS APPROACH: ICD-10-PCS | Performed by: INTERNAL MEDICINE

## 2021-07-22 NOTE — NUR
ASSUMED PT CARE AT 1900.PT IS ALERT AND ORIENTED X4. PT HAS CARPIO IN PLACE. PT
HAS ACE WRAPPING ON BLE WHICH IS DRY AND INTACT AND DOES NOT HAVE ANY
DRAINAGE.PT DID NOT HAVE BM ON THIS SHIFT. PT CAN BE NON-COMPLIANT WITH Q2HR
TURN. PT TAKES MEDS WHOLE WITH THE USE OF SPOON. PT PAIN MANAGED BY SCHEDULED
PAIN MEDS. PT USES CALL LIGHT TO VERBALIZE NEEDS AS NEEDED, PT TOLERATING RA.
FALL PRECAUTIONS IN PLACE WITH CALL LIGHT WITHIN REACH. WILL CONTINUE TO
MONITOR.
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
## 2021-07-22 NOTE — NUR
VAT CONSULTED FOR PICC LINE. PT'S LABS,MEDS,HX,ORDER AND CONSENT VERIFIED. COURTNEY TALLEY WAS WIDELY PATENT WITH USG, MEASURED 29% 4FR SL POWER PICC TRIMMED TO
38CM INSERTED X1 STICK TO 1CM EXTERNAL. PLACEMENT CONFIRMED WITH PEAKED
P-WAVES ON 3CG. PICC RELEASED FOR IMMEDIATE USE PER PROTOCOL. PT TOLERATED
WELL.

## 2021-07-22 NOTE — NUR
PT ALERT AND ORIENTED TIMES FOUR WITH PERIODS OF CONFUSION. VSS, CARPIO TO DD.
SCHEDULED PAIN MEDICATION CONTROLS PAIN WELL. PHYSICAL THEARPY CHANGED BLE
DRESSING TODAY. PT TOLERATES MEDS AND MEALS. PT SISTER AT BEDSIDE FOR MOST OF
THE DAY. PLANS FOR PT TO DISCHARGE TO IGNITE TODAY AT 1800. WILL CONTINUE TO
MONITOR.

## 2021-12-22 ENCOUNTER — HOSPITAL ENCOUNTER (OUTPATIENT)
Dept: HOSPITAL 35 - PAIN | Age: 61
End: 2021-12-22
Attending: CLINICAL NURSE SPECIALIST
Payer: COMMERCIAL

## 2021-12-22 VITALS — DIASTOLIC BLOOD PRESSURE: 72 MMHG | SYSTOLIC BLOOD PRESSURE: 102 MMHG

## 2021-12-22 DIAGNOSIS — Z88.8: ICD-10-CM

## 2021-12-22 DIAGNOSIS — I89.0: ICD-10-CM

## 2021-12-22 DIAGNOSIS — M79.604: ICD-10-CM

## 2021-12-22 DIAGNOSIS — M79.651: ICD-10-CM

## 2021-12-22 DIAGNOSIS — Z86.69: ICD-10-CM

## 2021-12-22 DIAGNOSIS — M79.641: Primary | ICD-10-CM

## 2021-12-22 DIAGNOSIS — Z79.899: ICD-10-CM

## 2021-12-22 NOTE — NUR
Pain Clinic Assessment:
 
1. History of Osteoarthritis:
NO
   History of Rheumatoid Arthritis:
NO
 
2. Height:  ft.  in.  cm.
   Weight:  lb.  oz.  kg.
   Patient's BMI:
 
3. Vital Signs:
   BP: 102/72 Pulse: 92 Resp: 20
   Temp:  02 Sat: 99 ECG Mon:
 
4. Pain Intensity: 8
 
5. Fall Risk:
   Dizziness: N  Needs help standing or walking: Y
   Fallen in the last 3 months: N
   Fall risk comments:
 
 
6. Patient on Blood Thinner: None
 
7. History of Hypertension: Y
 
8. Opioid Therapy greater than 6 weeks: Y
   Opiate Contract Signed: 06/23/16
 
9. Risk Assessment Tool Provided: 0 LOW RISK
 
10. Functional Assessment Tool: 40/70
 
11. Recreational Drug Use: Never Drug Type:
    Tobacco Use: Never Smoker Tobacco Type:
       Amount or Packs/day:  How Many Years:
    Alcohol Use: Past use  Frequency:  Quant:

## 2022-08-24 NOTE — NUR
A/O, calm and pleasant. dressing changed. complained of pain in legs, pain
medication given and worked. good appetite. no n/v. details… sensation intact/responds to pain/responds to verbal commands